# Patient Record
Sex: MALE | Race: WHITE | Employment: FULL TIME | URBAN - METROPOLITAN AREA
[De-identification: names, ages, dates, MRNs, and addresses within clinical notes are randomized per-mention and may not be internally consistent; named-entity substitution may affect disease eponyms.]

---

## 2017-02-14 DIAGNOSIS — I25.10 CAD (CORONARY ARTERY DISEASE): ICD-10-CM

## 2017-02-14 DIAGNOSIS — E78.5 HYPERLIPIDEMIA: ICD-10-CM

## 2017-02-14 DIAGNOSIS — Z82.49 FAMILY HISTORY OF CORONARY ARTERY DISEASE: ICD-10-CM

## 2017-02-14 RX ORDER — ATORVASTATIN CALCIUM 80 MG/1
80 TABLET, FILM COATED ORAL DAILY
Qty: 90 TABLET | Refills: 3 | Status: SHIPPED | OUTPATIENT
Start: 2017-02-14 | End: 2018-01-29

## 2017-06-09 DIAGNOSIS — I25.10 CORONARY ARTERY DISEASE INVOLVING NATIVE CORONARY ARTERY OF NATIVE HEART WITHOUT ANGINA PECTORIS: Primary | ICD-10-CM

## 2017-06-09 RX ORDER — NITROGLYCERIN 0.4 MG/1
TABLET SUBLINGUAL
Qty: 25 TABLET | Refills: 3 | Status: SHIPPED | OUTPATIENT
Start: 2017-06-09 | End: 2018-11-05

## 2017-09-29 ENCOUNTER — TELEPHONE (OUTPATIENT)
Dept: CARDIOLOGY | Facility: CLINIC | Age: 47
End: 2017-09-29

## 2017-09-29 NOTE — TELEPHONE ENCOUNTER
call out to the patient to see if he has had any labs or seen his PMD recently. Patient reporting that he has not seen his PMD recently and had not had any labs completed.. The patient is scheduled for an echo Monday at the [facility] and annual visit with Dr. Osborne on Tuesday 10/3/2017 - the patient likes to get his labs completed at his primary's office - advised to the patient that Dr. Osborne can order labs if needed at the office visit. No other tasks to be done at this time.  Majo Yang

## 2017-10-02 ENCOUNTER — HOSPITAL ENCOUNTER (OUTPATIENT)
Dept: CARDIOLOGY | Facility: CLINIC | Age: 47
Discharge: HOME | End: 2017-10-02
Attending: INTERNAL MEDICINE | Admitting: INTERNAL MEDICINE
Payer: COMMERCIAL

## 2017-10-02 DIAGNOSIS — E78.5 HYPERLIPIDEMIA, UNSPECIFIED HYPERLIPIDEMIA TYPE: ICD-10-CM

## 2017-10-02 PROCEDURE — 93306 TTE W/DOPPLER COMPLETE: CPT | Mod: 26 | Performed by: INTERNAL MEDICINE

## 2017-10-02 PROCEDURE — 93306 TTE W/DOPPLER COMPLETE: CPT

## 2017-10-03 ENCOUNTER — OFFICE VISIT (OUTPATIENT)
Dept: CARDIOLOGY | Facility: CLINIC | Age: 47
End: 2017-10-03
Attending: INTERNAL MEDICINE
Payer: COMMERCIAL

## 2017-10-03 VITALS
HEIGHT: 73 IN | BODY MASS INDEX: 27 KG/M2 | HEART RATE: 85 BPM | SYSTOLIC BLOOD PRESSURE: 140 MMHG | WEIGHT: 203.7 LBS | DIASTOLIC BLOOD PRESSURE: 91 MMHG

## 2017-10-03 DIAGNOSIS — E78.5 HYPERLIPIDEMIA, UNSPECIFIED HYPERLIPIDEMIA TYPE: ICD-10-CM

## 2017-10-03 PROCEDURE — 99213 OFFICE O/P EST LOW 20 MIN: CPT | Performed by: INTERNAL MEDICINE

## 2017-10-03 NOTE — LETTER
10/3/2017    [facility]  [address]  [facility] [address]    RE: Frank Hopper       Dear Colleague,    I had the pleasure of seeing Frank Hopper in the [facility].      Cardiology     I had the pleasure to follow up with your patient, Mr. Frank Hopper, in the Cardiovascular Medicine Clinic at [facility].  As you recall, this is a very pleasant 47-year-old gentleman who I first met in 2012.  He is a former patient of mine from [facility] who has followed to my current practice at [place].  He initially had an abnormal EKG with scooping ST segment depression in the inferior leads suggestive of ischemia.  The patient prior to the acquisition of the EKG had complained of chest discomfort while playing hockey.  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  He underwent a stress echocardiogram demonstrating a normal functional capacity, exercising for 13 METs with no evidence of ischemia; however, due to the concerning EKG, I ordered a CTA.  This demonstrated a  of the RCA with diffuse disease in a mild fashion of the LAD.  He underwent a cardiac cath in 02/2013 and was found to have a  of the RCA as demonstrated by CTA, and this was repaired by placing 3 drug-eluting stents from the mid RCA into the posterior descending artery in an overlapping fashion.  His EKG has since normalized.  He has not had any further symptoms or sequelae.  He had a myocardial perfusion study in 2015.  He exercised for 17 METs on a Alvin protocol.  No EKG changes.  The study was notable for normal LV systolic function with likely diaphragmatic attenuation.  No significant area of ischemia or infarct was noted.  He is not on a beta blocker due to relative bradycardia.  Here for a routine visit and has no active cardiac complaints.        Recent echocardiogram demonstrates normal LV systolic function. No significant valvular modalities noted. Here for his yearly  "follow-up.    PHYSICAL EXAMINATION:   VITAL SIGNS:  Blood pressure (!) 140/91, pulse 85, height 1.854 m (6' 1\"), weight 92.4 kg (203 lb 11.2 oz).  GENERAL:  The patient is alert, oriented, in no apparent distress.   HEENT:  Oropharynx clear, no sinus tenderness.   NECK:  No JVP, no lymphadenopathy, no carotid bruits.   CARDIOVASCULAR:  Regular rate and rhythm, normal S1, S2, no murmurs, gallops or rubs.   LUNGS:  Coarse but clear.   ABDOMEN:  Soft, nontender, nondistended.   EXTREMITIES:  No clubbing, cyanosis or edema.      ASSESSMENT:   1.  Atherosclerotic coronary artery disease with successful complex PCI of RCA  02/2013 with 3 overlapping Promus drug-eluting stents.   2.  Mild nonocclusive disease elsewhere.   3.  Normal myocardial perfusion study 12/2015 from a surveillance standpoint.   4.  Family history of coronary artery disease.   5.  Hyperlipidemia.   6.  Seasonal allergies.      RECOMMENDATIONS:   1.  Atherosclerotic coronary artery disease, stable.  He completed a 2-year course of aspirin and Plavix without difficulty.  Let us continue with his aspirin therapy indefinitely.  Again, we have not started him on a beta blocker due to his relative bradycardia.  He is a lifelong athlete.   2.  Stable EKG and myocardial perfusion study.  Patient exercised for 17 METs with no evidence of ischemia.   3.  Hyperlipidemia.  Let us continue with his statin therapy. He will get a check at his primary care physician.  4.  Borderline hypertension. He attributes this to going to the doctor's office. He also admits to not being as active as in the past. We have offered him therapeutic life changes with increasing his fruit intake, limiting salt intake and increasing activity level. He will purchase a blood pressure machine at home. He will call us with results over the course of the next month. If his blood pressure is elevated we will have him come back and start a medication for this.  5.  Return to clinic in 1 " year's time.    Thank you for allowing me to participate in the care of your patient.    Sincerely,     Sundar Osborne MD     [facility]

## 2017-10-03 NOTE — PROGRESS NOTES
"  Cardiology     I had the pleasure to follow up with your patient, Mr. Frank Hopper, in the Cardiovascular Medicine Clinic at [facility].  As you recall, this is a very pleasant 47-year-old gentleman who I first met in 2012.  He is a former patient of mine from [facility] who has followed to my current practice at [place].  He initially had an abnormal EKG with scooping ST segment depression in the inferior leads suggestive of ischemia.  The patient prior to the acquisition of the EKG had complained of chest discomfort while playing hockey.  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  He underwent a stress echocardiogram demonstrating a normal functional capacity, exercising for 13 METs with no evidence of ischemia; however, due to the concerning EKG, I ordered a CTA.  This demonstrated a  of the RCA with diffuse disease in a mild fashion of the LAD.  He underwent a cardiac cath in 02/2013 and was found to have a  of the RCA as demonstrated by CTA, and this was repaired by placing 3 drug-eluting stents from the mid RCA into the posterior descending artery in an overlapping fashion.  His EKG has since normalized.  He has not had any further symptoms or sequelae.  He had a myocardial perfusion study in 2015.  He exercised for 17 METs on a Alvin protocol.  No EKG changes.  The study was notable for normal LV systolic function with likely diaphragmatic attenuation.  No significant area of ischemia or infarct was noted.  He is not on a beta blocker due to relative bradycardia.  Here for a routine visit and has no active cardiac complaints.        Recent echocardiogram demonstrates normal LV systolic function. No significant valvular modalities noted. Here for his yearly follow-up.    PHYSICAL EXAMINATION:   VITAL SIGNS:  Blood pressure (!) 140/91, pulse 85, height 1.854 m (6' 1\"), weight 92.4 kg (203 lb 11.2 oz).  GENERAL:  The patient is alert, oriented, in no apparent distress.   HEENT:  " Oropharynx clear, no sinus tenderness.   NECK:  No JVP, no lymphadenopathy, no carotid bruits.   CARDIOVASCULAR:  Regular rate and rhythm, normal S1, S2, no murmurs, gallops or rubs.   LUNGS:  Coarse but clear.   ABDOMEN:  Soft, nontender, nondistended.   EXTREMITIES:  No clubbing, cyanosis or edema.      ASSESSMENT:   1.  Atherosclerotic coronary artery disease with successful complex PCI of RCA  02/2013 with 3 overlapping Promus drug-eluting stents.   2.  Mild nonocclusive disease elsewhere.   3.  Normal myocardial perfusion study 12/2015 from a surveillance standpoint.   4.  Family history of coronary artery disease.   5.  Hyperlipidemia.   6.  Seasonal allergies.      RECOMMENDATIONS:   1.  Atherosclerotic coronary artery disease, stable.  He completed a 2-year course of aspirin and Plavix without difficulty.  Let us continue with his aspirin therapy indefinitely.  Again, we have not started him on a beta blocker due to his relative bradycardia.  He is a lifelong athlete.   2.  Stable EKG and myocardial perfusion study.  Patient exercised for 17 METs with no evidence of ischemia.   3.  Hyperlipidemia.  Let us continue with his statin therapy. He will get a check at his primary care physician.  4.  Borderline hypertension. He attributes this to going to the doctor's office. He also admits to not being as active as in the past. We have offered him therapeutic life changes with increasing his fruit intake, limiting salt intake and increasing activity level. He will purchase a blood pressure machine at home. He will call us with results over the course of the next month. If his blood pressure is elevated we will have him come back and start a medication for this.  5.  Return to clinic in 1 year's time.    Sundar Osborne MD

## 2017-10-03 NOTE — MR AVS SNAPSHOT
"              After Visit Summary   10/3/2017    Frank Hopper    MRN: [id number]           Patient Information     Date Of Birth          1970        Visit Information        Provider Department      10/3/2017 1:15 PM Sundar Osborne MD [facility] [employer]        Today's Diagnoses     Hyperlipidemia, unspecified hyperlipidemia type           Follow-ups after your visit        Additional Services     Follow-Up with Cardiologist                 Future tests that were ordered for you today     Open Future Orders        Priority Expected Expires Ordered    Follow-Up with Cardiologist Routine 10/3/2018 10/4/2018 10/3/2017            Who to contact     If you have questions or need follow up information about today's clinic visit or your schedule please contact [facility] directly at [phone].  Normal or non-critical lab and imaging results will be communicated to you by MyChart, letter or phone within 4 business days after the clinic has received the results. If you do not hear from us within 7 days, please contact the clinic through MyChart or phone. If you have a critical or abnormal lab result, we will notify you by phone as soon as possible.  Submit refill requests through [employer] or call your pharmacy and they will forward the refill request to us. Please allow 3 business days for your refill to be completed.          Additional Information About Your Visit        [employer] Information     Cyanto lets you send messages to your doctor, view your test results, renew your prescriptions, schedule appointments and more. To sign up, go to [email or web address] . Click on \"Log in\" on the left side of the screen, which will take you to the Welcome page. Then click on \"Sign up Now\" on the right side of the page.     You will be asked to enter the access code listed below, as well as some personal information. Please follow the " "directions to create your username and password.     Your access code is: [email or web address]  Expires: 2018  1:39 PM     Your access code will  in 90 days. If you need help or a new code, please call your [place] clinic or [phone].        Care EveryWhere ID     This is your Care EveryWhere ID. This could be used by other organizations to access your [place] medical records  [id number]        Your Vitals Were     Pulse Height BMI (Body Mass Index)             85 1.854 m (6' 1\") 26.87 kg/m2          Blood Pressure from Last 3 Encounters:   10/03/17 (!) 140/91   10/10/16 130/90   16 114/70    Weight from Last 3 Encounters:   10/03/17 92.4 kg (203 lb 11.2 oz)   10/10/16 93.4 kg (206 lb)   16 90.7 kg (200 lb)              We Performed the Following     Follow-Up with Cardiologist        Primary Care Provider Office Phone # Fax #    [facility] [phone]       [address]  [facility] [address]        Equal Access to Services     EVONNE MARTIN : Hadii aad ku hadasho Soomaali, waaxda luqadaha, qaybta kaalmada adeegyada, enriqueta isbelln lilliana puckett. So [facility] [phone].    ATENCIÓN: Si habla español, tiene a martinez disposición servicios gratuitos de asistencia lingüística. Llame al [phone].    We comply with applicable federal civil rights laws and [place] laws. We do not discriminate on the basis of race, color, national origin, age, disability, sex, sexual orientation, or gender identity.            Thank you!     Thank you for choosing [facility] [place]  for your care. Our goal is always to provide you with excellent care. Hearing back from our patients is one way we can continue to improve our services. Please take a few minutes to complete the written survey that you may receive in the mail after your visit with us. Thank you!             Your Updated Medication List - Protect others around you: Learn how to " safely use, store and throw away your medicines at www.disposemymeds.org.          This list is accurate as of: 10/3/17  1:39 PM.  Always use your most recent med list.                   Brand Name Dispense Instructions for use Diagnosis    aspirin 81 MG tablet      Take by mouth daily        atorvastatin 80 MG tablet    LIPITOR    90 tablet    Take 1 tablet (80 mg) by mouth daily    CAD (coronary artery disease), Family history of coronary artery disease, Hyperlipidemia       loratadine 10 MG tablet    CLARITIN     Take 10 mg by mouth as needed for allergies        nitroGLYcerin 0.4 MG sublingual tablet    NITROSTAT    25 tablet    1 tablet every 3-5 minutes prn chest pain    Coronary artery disease involving native coronary artery of native heart without angina pectoris

## 2017-10-17 ENCOUNTER — CARE COORDINATION (OUTPATIENT)
Dept: CARDIOLOGY | Facility: CLINIC | Age: 47
End: 2017-10-17

## 2017-10-17 NOTE — PROGRESS NOTES
lab orders for FLP/ALT and BMP sent to patients PMD - [facility]. Patient aware to contact our clinic if these orders are not obtained by [facility]. No other tasks to be done at this time. Majo Yang

## 2017-10-26 ENCOUNTER — TRANSFERRED RECORDS (OUTPATIENT)
Dept: HEALTH INFORMATION MANAGEMENT | Facility: CLINIC | Age: 47
End: 2017-10-26

## 2017-10-26 LAB
ANION GAP SERPL CALCULATED.3IONS-SCNC: 9 MMOL/L
BUN SERPL-MCNC: 10 MG/DL
CALCIUM SERPL-MCNC: 9.4 MG/DL
CHLORIDE SERPLBLD-SCNC: 104 MMOL/L
CHOLEST SERPL-MCNC: 184 MG/DL
CO2 SERPL-SCNC: 25 MMOL/L
CREAT SERPL-MCNC: 0.98 MG/DL
GFR SERPL CREATININE-BSD FRML MDRD: >60 ML/MIN/1.73M2
GLUCOSE SERPL-MCNC: 101 MG/DL (ref 70–99)
HDLC SERPL-MCNC: 51 MG/DL
LDLC SERPL CALC-MCNC: 104 MG/DL
NONHDLC SERPL-MCNC: 133 MG/DL
POTASSIUM SERPL-SCNC: 4.2 MMOL/L
SODIUM SERPL-SCNC: 138 MMOL/L
TRIGL SERPL-MCNC: 145 MG/DL

## 2017-12-18 DIAGNOSIS — E78.5 HYPERLIPIDEMIA, UNSPECIFIED HYPERLIPIDEMIA TYPE: Primary | ICD-10-CM

## 2018-01-29 DIAGNOSIS — I25.10 CAD (CORONARY ARTERY DISEASE): ICD-10-CM

## 2018-01-29 DIAGNOSIS — Z82.49 FAMILY HISTORY OF CORONARY ARTERY DISEASE: ICD-10-CM

## 2018-01-29 DIAGNOSIS — E78.5 HYPERLIPIDEMIA: ICD-10-CM

## 2018-01-29 RX ORDER — ATORVASTATIN CALCIUM 80 MG/1
80 TABLET, FILM COATED ORAL DAILY
Qty: 90 TABLET | Refills: 2 | Status: SHIPPED | OUTPATIENT
Start: 2018-01-29 | End: 2018-07-10

## 2018-05-15 ENCOUNTER — CARE COORDINATION (OUTPATIENT)
Dept: CARDIOLOGY | Facility: CLINIC | Age: 48
End: 2018-05-15

## 2018-05-15 DIAGNOSIS — I10 HTN (HYPERTENSION): Primary | ICD-10-CM

## 2018-05-15 RX ORDER — AMLODIPINE BESYLATE 2.5 MG/1
2.5 TABLET ORAL DAILY
Qty: 90 TABLET | Refills: 0 | Status: SHIPPED | OUTPATIENT
Start: 2018-05-15 | End: 2018-07-10

## 2018-05-15 NOTE — PROGRESS NOTES
Patient requesting to follow up with his primary out in [place] for a BP check after 2-4 weeks.. Dr. India granados with this plan.  Primary MD to fax us the records from visit. No other tasks to be done at this time. Majo Yang

## 2018-05-15 NOTE — PROGRESS NOTES
patient c/o uncontrolled BP - Dr. Osborne would like to start him on Norvasc 2.5 mg.  The patient to follow up in clinic to review BP in the next 2-4 weeks. Patient advised to contact our clinic if the BP doesn't lower or he notices any lower extremity swelling.. The patient will contact our clinic to schedule his follow up. Order placed in epic. No other tasks to be done at his time. Majo Yang

## 2018-07-10 DIAGNOSIS — Z82.49 FAMILY HISTORY OF CORONARY ARTERY DISEASE: ICD-10-CM

## 2018-07-10 DIAGNOSIS — I25.10 CAD (CORONARY ARTERY DISEASE): ICD-10-CM

## 2018-07-10 DIAGNOSIS — I10 HTN (HYPERTENSION): ICD-10-CM

## 2018-07-10 DIAGNOSIS — E78.5 HYPERLIPIDEMIA: ICD-10-CM

## 2018-07-10 RX ORDER — ATORVASTATIN CALCIUM 80 MG/1
80 TABLET, FILM COATED ORAL DAILY
Qty: 90 TABLET | Refills: 3 | Status: SHIPPED | OUTPATIENT
Start: 2018-07-10 | End: 2019-08-11

## 2018-07-10 RX ORDER — AMLODIPINE BESYLATE 2.5 MG/1
2.5 TABLET ORAL 2 TIMES DAILY
Qty: 180 TABLET | Refills: 3 | Status: SHIPPED | OUTPATIENT
Start: 2018-07-10 | End: 2018-08-13

## 2018-08-13 DIAGNOSIS — I10 HTN (HYPERTENSION): ICD-10-CM

## 2018-08-13 RX ORDER — AMLODIPINE BESYLATE 2.5 MG/1
2.5 TABLET ORAL 2 TIMES DAILY
Qty: 180 TABLET | Refills: 3 | Status: SHIPPED | OUTPATIENT
Start: 2018-08-13 | End: 2021-01-01

## 2018-11-02 PROBLEM — I10 HTN (HYPERTENSION): Status: ACTIVE | Noted: 2018-11-02

## 2018-11-05 ENCOUNTER — OFFICE VISIT (OUTPATIENT)
Dept: CARDIOLOGY | Facility: CLINIC | Age: 48
End: 2018-11-05
Payer: COMMERCIAL

## 2018-11-05 VITALS
HEART RATE: 80 BPM | HEIGHT: 73 IN | DIASTOLIC BLOOD PRESSURE: 88 MMHG | SYSTOLIC BLOOD PRESSURE: 140 MMHG | BODY MASS INDEX: 27.17 KG/M2 | WEIGHT: 205 LBS

## 2018-11-05 DIAGNOSIS — I25.10 CORONARY ARTERY DISEASE INVOLVING NATIVE CORONARY ARTERY OF NATIVE HEART WITHOUT ANGINA PECTORIS: ICD-10-CM

## 2018-11-05 PROCEDURE — 99214 OFFICE O/P EST MOD 30 MIN: CPT | Performed by: INTERNAL MEDICINE

## 2018-11-05 RX ORDER — NITROGLYCERIN 0.4 MG/1
TABLET SUBLINGUAL
Qty: 25 TABLET | Refills: 3 | Status: SHIPPED | OUTPATIENT
Start: 2018-11-05 | End: 2019-11-08

## 2018-11-05 NOTE — MR AVS SNAPSHOT
"              After Visit Summary   11/5/2018    Frank Hopper    MRN: [id number]           Patient Information     Date Of Birth          1970        Visit Information        Provider Department      11/5/2018 1:45 PM Sundar Osborne MD [facility]        Today's Diagnoses     Coronary artery disease involving native coronary artery of native heart without angina pectoris           Follow-ups after your visit        Additional Services     Follow-Up with Cardiologist                 Future tests that were ordered for you today     Open Future Orders        Priority Expected Expires Ordered    Follow-Up with Cardiologist Routine 11/5/2019 11/6/2019 11/5/2018    NM Exercise stress test (nuc card) Routine 12/5/2018 11/5/2019 11/5/2018            Who to contact     If you have questions or need follow up information about today's clinic visit or your schedule please contact [facility] directly at [phone].  Normal or non-critical lab and imaging results will be communicated to you by Mount[employer]hart, letter or phone within 4 business days after the clinic has received the results. If you do not hear from us within 7 days, please contact the clinic through [employer] or phone. If you have a critical or abnormal lab result, we will notify you by phone as soon as possible.  Submit refill requests through [employer] or call your pharmacy and they will forward the refill request to us. Please allow 3 business days for your refill to be completed.          Additional Information About Your Visit        [employer] Information     [employer] lets you send messages to your doctor, view your test results, renew your prescriptions, schedule appointments and more. To sign up, go to [email or web address] . Click on \"Log in\" on the left side of the screen, which will take you to the Welcome page. Then click on \"Sign up Now\" on the right side of the page. " "    You will be asked to enter the access code listed below, as well as some personal information. Please follow the directions to create your username and password.     Your access code is: [email or web address]  Expires: 2/3/2019  2:05 PM     Your access code will  in 90 days. If you need help or a new code, please call your [place] clinic or [phone].        Care EveryWhere ID     This is your Care EveryWhere ID. This could be used by other organizations to access your [place] medical records  [id number]        Your Vitals Were     Pulse Height BMI (Body Mass Index)             80 1.854 m (6' 1\") 27.05 kg/m2          Blood Pressure from Last 3 Encounters:   18 140/88   10/03/17 (!) 140/91   10/10/16 130/90    Weight from Last 3 Encounters:   18 93 kg (205 lb)   10/03/17 92.4 kg (203 lb 11.2 oz)   10/10/16 93.4 kg (206 lb)                 Today's Medication Changes          These changes are accurate as of 18  2:05 PM.  If you have any questions, ask your nurse or doctor.               These medicines have changed or have updated prescriptions.        Dose/Directions    amLODIPine 2.5 MG tablet   Commonly known as:  NORVASC   This may have changed:  when to take this   Used for:  HTN (hypertension)        Dose:  2.5 mg   Take 1 tablet (2.5 mg) by mouth 2 times daily   Quantity:  180 tablet   Refills:  3            Where to get your medicines      These medications were sent to [place] [address] [place] [facility] [place] [place] -  [facility]   [address] [address]     Phone:  [phone]     nitroGLYcerin 0.4 MG sublingual tablet                Primary Care Provider Office Phone # Fax #    [facility] [phone]       [address] [address]        Equal Access to Services     RENÉ MARITN AH: Alyson Green, waaxda luqadaha, qaybta kaalmada lillianayadameon, enriqueta crowder . So [facility] " [phone].    ATENCIÓN: Si lali freire, tiene a martinez disposición servicios gratuitos de asistencia lingüística. Jose bates [phone].    We comply with applicable federal civil rights laws and [place] laws. We do not discriminate on the basis of race, color, national origin, age, disability, sex, sexual orientation, or gender identity.            Thank you!     Thank you for choosing [facility]  for your care. Our goal is always to provide you with excellent care. Hearing back from our patients is one way we can continue to improve our services. Please take a few minutes to complete the written survey that you may receive in the mail after your visit with us. Thank you!             Your Updated Medication List - Protect others around you: Learn how to safely use, store and throw away your medicines at www.disposemymeds.org.          This list is accurate as of 11/5/18  2:05 PM.  Always use your most recent med list.                   Brand Name Dispense Instructions for use Diagnosis    amLODIPine 2.5 MG tablet    NORVASC    180 tablet    Take 1 tablet (2.5 mg) by mouth 2 times daily    HTN (hypertension)       aspirin 81 MG tablet      Take by mouth daily        atorvastatin 80 MG tablet    LIPITOR    90 tablet    Take 1 tablet (80 mg) by mouth daily    CAD (coronary artery disease), Family history of coronary artery disease, Hyperlipidemia       loratadine 10 MG tablet    CLARITIN     Take 10 mg by mouth as needed for allergies        nitroGLYcerin 0.4 MG sublingual tablet    NITROSTAT    25 tablet    1 tablet every 3-5 minutes prn chest pain    Coronary artery disease involving native coronary artery of native heart without angina pectoris

## 2018-11-05 NOTE — LETTER
11/5/2018    [facility]  [address]  [facility] [address]    RE: Frank Hopper       Dear Colleague,    I had the pleasure of seeing Frank Hopper in the [facility].      Cardiology     I had the pleasure to follow up with your patient, Mr. Frank Hopper, in the Cardiovascular Medicine Clinic at [facility].  As you recall, this is a very pleasant 48-year-old gentleman who I first met in 2012.  He is a former patient of mine from [facility] who has followed to my current practice at [place].  He initially had an abnormal EKG with scooping ST segment depression in the inferior leads suggestive of ischemia.  The patient prior to the acquisition of the EKG had complained of chest discomfort while playing hockey.  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  He underwent a stress echocardiogram demonstrating a normal functional capacity, exercising for 13 METs with no evidence of ischemia; however, due to the concerning EKG, I ordered a CTA.  This demonstrated a  of the RCA with diffuse disease in a mild fashion of the LAD.  He underwent a cardiac cath in 02/2013 and was found to have a  of the RCA as demonstrated by CTA, and this was repaired by placing 3 drug-eluting stents from the mid RCA into the posterior descending artery in an overlapping fashion.  His EKG has since normalized.  He has not had any further symptoms or sequelae.  He had a myocardial perfusion study in 2015.  He exercised for 17 METs on a Alvin protocol.  No EKG changes.  The study was notable for normal LV systolic function with likely diaphragmatic attenuation.  No significant area of ischemia or infarct was noted.  He is not on a beta blocker due to relative bradycardia.  Here for a routine visit and has no active cardiac complaints.        Recent echocardiogram demonstrates normal LV systolic function. No significant valvular modalities noted. Here for his yearly  "follow-up.    Wants to resume hockey playing, however worried about his fitness level.       PHYSICAL EXAMINATION:   VITAL SIGNS:  Blood pressure 140/88, pulse 80, height 1.854 m (6' 1\"), weight 93 kg (205 lb).  GENERAL:  The patient is alert, oriented, in no apparent distress.   HEENT:  Oropharynx clear, no sinus tenderness.   NECK:  No JVP, no lymphadenopathy, no carotid bruits.   CARDIOVASCULAR:  Regular rate and rhythm, normal S1, S2, no murmurs, gallops or rubs.   LUNGS:  Coarse but clear.   ABDOMEN:  Soft, nontender, nondistended.   EXTREMITIES:  No clubbing, cyanosis or edema.      ASSESSMENT:   1.  Atherosclerotic coronary artery disease with successful complex PCI of RCA  02/2013 with 3 overlapping Promus drug-eluting stents.   2.  Mild nonocclusive disease elsewhere.   3.  Normal myocardial perfusion study 12/2015 from a surveillance standpoint.   4.  Family history of coronary artery disease.   5.  Hyperlipidemia.   6.  Seasonal allergies.      RECOMMENDATIONS:   1.  Atherosclerotic coronary artery disease, stable.  He completed a 2-year course of aspirin and Plavix without difficulty.  Let us continue with his aspirin therapy indefinitely.  Again, we have not started him on a beta blocker due to his relative bradycardia and fatigue.  He is a lifelong athlete.   2.  Stable EKG and myocardial perfusion study 2015.  Patient exercised for 17 METs with no evidence of ischemia.   3.  Hyperlipidemia.  Let us continue with his statin therapy. He will get a check at his primary care physician. LDL is 99, he will try to improve his diet or we can change to Crestor.    4.  HTN: Continue Cozaar 50 mg daily  5.  Will get surveillance nuclear MPI at [place] (closer for patient).    6.  Return to clinic in 1 year's time or earlier if significant abnormalities noted in MPI.      Sundar Osbonre MD      Thank you for allowing me to participate in the care of your patient.      Sincerely,     Sundar Pederson " MD [place]     [facility]

## 2018-11-05 NOTE — PROGRESS NOTES
"  Cardiology     I had the pleasure to follow up with your patient, Mr. Frank Hopper, in the Cardiovascular Medicine Clinic at [facility].  As you recall, this is a very pleasant 48-year-old gentleman who I first met in 2012.  He is a former patient of mine from [facility] who has followed to my current practice at [place].  He initially had an abnormal EKG with scooping ST segment depression in the inferior leads suggestive of ischemia.  The patient prior to the acquisition of the EKG had complained of chest discomfort while playing hockey.  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  He underwent a stress echocardiogram demonstrating a normal functional capacity, exercising for 13 METs with no evidence of ischemia; however, due to the concerning EKG, I ordered a CTA.  This demonstrated a  of the RCA with diffuse disease in a mild fashion of the LAD.  He underwent a cardiac cath in 02/2013 and was found to have a  of the RCA as demonstrated by CTA, and this was repaired by placing 3 drug-eluting stents from the mid RCA into the posterior descending artery in an overlapping fashion.  His EKG has since normalized.  He has not had any further symptoms or sequelae.  He had a myocardial perfusion study in 2015.  He exercised for 17 METs on a Alvin protocol.  No EKG changes.  The study was notable for normal LV systolic function with likely diaphragmatic attenuation.  No significant area of ischemia or infarct was noted.  He is not on a beta blocker due to relative bradycardia.  Here for a routine visit and has no active cardiac complaints.        Recent echocardiogram demonstrates normal LV systolic function. No significant valvular modalities noted. Here for his yearly follow-up.    Wants to resume hockey playing, however worried about his fitness level.       PHYSICAL EXAMINATION:   VITAL SIGNS:  Blood pressure 140/88, pulse 80, height 1.854 m (6' 1\"), weight 93 kg (205 lb).  GENERAL:  The " patient is alert, oriented, in no apparent distress.   HEENT:  Oropharynx clear, no sinus tenderness.   NECK:  No JVP, no lymphadenopathy, no carotid bruits.   CARDIOVASCULAR:  Regular rate and rhythm, normal S1, S2, no murmurs, gallops or rubs.   LUNGS:  Coarse but clear.   ABDOMEN:  Soft, nontender, nondistended.   EXTREMITIES:  No clubbing, cyanosis or edema.      ASSESSMENT:   1.  Atherosclerotic coronary artery disease with successful complex PCI of RCA  02/2013 with 3 overlapping Promus drug-eluting stents.   2.  Mild nonocclusive disease elsewhere.   3.  Normal myocardial perfusion study 12/2015 from a surveillance standpoint.   4.  Family history of coronary artery disease.   5.  Hyperlipidemia.   6.  Seasonal allergies.      RECOMMENDATIONS:   1.  Atherosclerotic coronary artery disease, stable.  He completed a 2-year course of aspirin and Plavix without difficulty.  Let us continue with his aspirin therapy indefinitely.  Again, we have not started him on a beta blocker due to his relative bradycardia and fatigue.  He is a lifelong athlete.   2.  Stable EKG and myocardial perfusion study 2015.  Patient exercised for 17 METs with no evidence of ischemia.   3.  Hyperlipidemia.  Let us continue with his statin therapy. He will get a check at his primary care physician. LDL is 99, he will try to improve his diet or we can change to Crestor.    4.  HTN: Continue Cozaar 50 mg daily  5.  Will get surveillance nuclear MPI at [place] (closer for patient).    6.  Return to clinic in 1 year's time or earlier if significant abnormalities noted in MPI.      Sundar Osborne MD

## 2018-11-05 NOTE — LETTER
11/5/2018    [facility]  [address]  [facility] [address]    RE: Frank Hopper       Dear Colleague,    I had the pleasure of seeing Frank Hopper in [facility].      Cardiology     I had the pleasure to follow up with your patient, Mr. Frank Hopper, in the Cardiovascular Medicine Clinic at [facility].  As you recall, this is a very pleasant 48-year-old gentleman who I first met in 2012.  He is a former patient of mine from [facility] who has followed to my current practice at [place].  He initially had an abnormal EKG with scooping ST segment depression in the inferior leads suggestive of ischemia.  The patient prior to the acquisition of the EKG had complained of chest discomfort while playing hockey.  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  He underwent a stress echocardiogram demonstrating a normal functional capacity, exercising for 13 METs with no evidence of ischemia; however, due to the concerning EKG, I ordered a CTA.  This demonstrated a  of the RCA with diffuse disease in a mild fashion of the LAD.  He underwent a cardiac cath in 02/2013 and was found to have a  of the RCA as demonstrated by CTA, and this was repaired by placing 3 drug-eluting stents from the mid RCA into the posterior descending artery in an overlapping fashion.  His EKG has since normalized.  He has not had any further symptoms or sequelae.  He had a myocardial perfusion study in 2015.  He exercised for 17 METs on a Alvin protocol.  No EKG changes.  The study was notable for normal LV systolic function with likely diaphragmatic attenuation.  No significant area of ischemia or infarct was noted.  He is not on a beta blocker due to relative bradycardia.  Here for a routine visit and has no active cardiac complaints.        Recent echocardiogram demonstrates normal LV systolic function. No significant valvular modalities noted. Here for his yearly  "follow-up.    Wants to resume hockey playing, however worried about his fitness level.       PHYSICAL EXAMINATION:   VITAL SIGNS:  Blood pressure 140/88, pulse 80, height 1.854 m (6' 1\"), weight 93 kg (205 lb).  GENERAL:  The patient is alert, oriented, in no apparent distress.   HEENT:  Oropharynx clear, no sinus tenderness.   NECK:  No JVP, no lymphadenopathy, no carotid bruits.   CARDIOVASCULAR:  Regular rate and rhythm, normal S1, S2, no murmurs, gallops or rubs.   LUNGS:  Coarse but clear.   ABDOMEN:  Soft, nontender, nondistended.   EXTREMITIES:  No clubbing, cyanosis or edema.      ASSESSMENT:   1.  Atherosclerotic coronary artery disease with successful complex PCI of RCA  02/2013 with 3 overlapping Promus drug-eluting stents.   2.  Mild nonocclusive disease elsewhere.   3.  Normal myocardial perfusion study 12/2015 from a surveillance standpoint.   4.  Family history of coronary artery disease.   5.  Hyperlipidemia.   6.  Seasonal allergies.      RECOMMENDATIONS:   1.  Atherosclerotic coronary artery disease, stable.  He completed a 2-year course of aspirin and Plavix without difficulty.  Let us continue with his aspirin therapy indefinitely.  Again, we have not started him on a beta blocker due to his relative bradycardia and fatigue.  He is a lifelong athlete.   2.  Stable EKG and myocardial perfusion study 2015.  Patient exercised for 17 METs with no evidence of ischemia.   3.  Hyperlipidemia.  Let us continue with his statin therapy. He will get a check at his primary care physician. LDL is 99, he will try to improve his diet or we can change to Crestor.    4.  HTN: Continue Cozaar 50 mg daily  5.  Will get surveillance nuclear MPI at [place] (closer for patient).    6.  Return to clinic in 1 year's time or earlier if significant abnormalities noted in MPI.      Sundar Osborne MD      Thank you for allowing me to participate in the care of your patient.      Sincerely,     Sundar Pederson " MD [place]     [facility]    cc:   No referring provider defined for this encounter.

## 2018-11-21 ENCOUNTER — HOSPITAL ENCOUNTER (OUTPATIENT)
Dept: NUCLEAR MEDICINE | Facility: CLINIC | Age: 48
Setting detail: NUCLEAR MEDICINE
Discharge: HOME | End: 2018-11-21
Attending: INTERNAL MEDICINE | Admitting: INTERNAL MEDICINE
Payer: COMMERCIAL

## 2018-11-21 DIAGNOSIS — I25.10 CORONARY ARTERY DISEASE INVOLVING NATIVE CORONARY ARTERY OF NATIVE HEART WITHOUT ANGINA PECTORIS: ICD-10-CM

## 2018-11-21 PROCEDURE — 93016 CV STRESS TEST SUPVJ ONLY: CPT | Performed by: INTERNAL MEDICINE

## 2018-11-21 PROCEDURE — 78452 HT MUSCLE IMAGE SPECT MULT: CPT

## 2018-11-21 PROCEDURE — 93017 CV STRESS TEST TRACING ONLY: CPT

## 2018-11-21 PROCEDURE — 78452 HT MUSCLE IMAGE SPECT MULT: CPT | Mod: 26 | Performed by: INTERNAL MEDICINE

## 2018-11-21 PROCEDURE — A9502 TC99M TETROFOSMIN: HCPCS | Performed by: INTERNAL MEDICINE

## 2018-11-21 PROCEDURE — 93018 CV STRESS TEST I&R ONLY: CPT | Performed by: INTERNAL MEDICINE

## 2018-11-21 PROCEDURE — 34300033 ZZH RX 343: Performed by: INTERNAL MEDICINE

## 2018-11-21 RX ADMIN — TETROFOSMIN 10.1 MCI.: 1.38 INJECTION, POWDER, LYOPHILIZED, FOR SOLUTION INTRAVENOUS at 08:45

## 2018-11-21 RX ADMIN — TETROFOSMIN 32.6 MCI.: 1.38 INJECTION, POWDER, LYOPHILIZED, FOR SOLUTION INTRAVENOUS at 10:27

## 2019-08-11 DIAGNOSIS — I25.10 CAD (CORONARY ARTERY DISEASE): ICD-10-CM

## 2019-08-11 DIAGNOSIS — E78.5 HYPERLIPIDEMIA: ICD-10-CM

## 2019-08-11 DIAGNOSIS — Z82.49 FAMILY HISTORY OF CORONARY ARTERY DISEASE: ICD-10-CM

## 2019-08-12 RX ORDER — ATORVASTATIN CALCIUM 80 MG/1
TABLET, FILM COATED ORAL
Qty: 90 TABLET | Refills: 1 | Status: SHIPPED | OUTPATIENT
Start: 2019-08-12 | End: 2020-02-17

## 2019-09-12 DIAGNOSIS — I25.10 CORONARY ARTERY DISEASE INVOLVING NATIVE CORONARY ARTERY OF NATIVE HEART WITHOUT ANGINA PECTORIS: Primary | ICD-10-CM

## 2019-11-08 ENCOUNTER — OFFICE VISIT (OUTPATIENT)
Dept: CARDIOLOGY | Facility: CLINIC | Age: 49
End: 2019-11-08
Payer: COMMERCIAL

## 2019-11-08 VITALS
DIASTOLIC BLOOD PRESSURE: 82 MMHG | OXYGEN SATURATION: 99 % | BODY MASS INDEX: 27.44 KG/M2 | WEIGHT: 208 LBS | SYSTOLIC BLOOD PRESSURE: 124 MMHG | HEART RATE: 71 BPM

## 2019-11-08 DIAGNOSIS — I25.10 CORONARY ARTERY DISEASE INVOLVING NATIVE CORONARY ARTERY OF NATIVE HEART WITHOUT ANGINA PECTORIS: ICD-10-CM

## 2019-11-08 LAB
ALT SERPL W P-5'-P-CCNC: 33 U/L (ref 0–70)
CHOLEST SERPL-MCNC: 157 MG/DL
HDLC SERPL-MCNC: 54 MG/DL
LDLC SERPL CALC-MCNC: 77 MG/DL
NONHDLC SERPL-MCNC: 103 MG/DL
TRIGL SERPL-MCNC: 132 MG/DL

## 2019-11-08 PROCEDURE — 80061 LIPID PANEL: CPT | Performed by: INTERNAL MEDICINE

## 2019-11-08 PROCEDURE — 84460 ALANINE AMINO (ALT) (SGPT): CPT | Performed by: INTERNAL MEDICINE

## 2019-11-08 PROCEDURE — 99214 OFFICE O/P EST MOD 30 MIN: CPT | Performed by: INTERNAL MEDICINE

## 2019-11-08 PROCEDURE — 36415 COLL VENOUS BLD VENIPUNCTURE: CPT | Performed by: INTERNAL MEDICINE

## 2019-11-08 RX ORDER — NITROGLYCERIN 0.4 MG/1
TABLET SUBLINGUAL
Qty: 25 TABLET | Refills: 3 | Status: SHIPPED | OUTPATIENT
Start: 2019-11-08 | End: 2020-12-21

## 2019-11-08 NOTE — PROGRESS NOTES
Cardiology     I had the pleasure to follow up with your patient, Mr. Frank Hopper, in the Cardiovascular Medicine Clinic at [facility].  As you recall, this is a very pleasant 49-year-old gentleman who I first met in 2012.  He is a former patient of mine from [facility] who has followed to my current practice at [place].  He initially had an abnormal EKG with scooping ST segment depression in the inferior leads suggestive of ischemia.  The patient prior to the acquisition of the EKG had complained of chest discomfort while playing hockey.  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  He underwent a stress echocardiogram demonstrating a normal functional capacity, exercising for 13 METs with no evidence of ischemia; however, due to the concerning EKG, I ordered a CTA.  This demonstrated a  of the RCA with diffuse disease in a mild fashion of the LAD.  He underwent a cardiac cath in 02/2013 and was found to have a  of the RCA as demonstrated by CTA, and this was repaired by placing 3 drug-eluting stents from the mid RCA into the posterior descending artery in an overlapping fashion.  His EKG has since normalized.  He has not had any further symptoms or sequelae.  He had a myocardial perfusion study in 2015.  He exercised for 17 METs on a Alvin protocol.  No EKG changes.  The study was notable for normal LV systolic function with likely diaphragmatic attenuation.  No significant area of ischemia or infarct was noted.  He is not on a beta blocker due to relative bradycardia.  Here for a routine visit and has no active cardiac complaints.        Recent echocardiogram demonstrates normal LV systolic function. No significant valvular modalities noted. Here for his yearly follow-up.      2018 MPI:  Impression    1.  Myocardial perfusion imaging using single isotope technique  demonstrated normal myocardial perfusion.   2. Gated images demonstrated normal wall motion.  The left  ventricular  systolic function is normal with a calculated ejection fraction of  63%.     Procedure  The patient performed treadmill exercise using a Alvin protocol,  completing 13 minutes and 40 seconds with an estimated workload of  16.5 METS.  The test was terminated due to fatigue. The heart rate was  58 beats per minute at baseline and increased to 169 beats at peak  exercise, which was 99% of the maximum predicted heart rate. The rest  blood pressure was 122/80 mm/Hg and peak blood pressure is 172/76mm/Hg  with rate pressure product of 29,068. The patient experienced no chest  pain during the test. The patient was not on a beta blocker.     Myocardial perfusion imaging was performed at rest, approximately 45  minutes after the injection intravenously of 10.1of Tc-99m Myoview. At  peak exercise, the patient was injected intravenously with 32.6 mCi of   Tc-99m Myoview and exercise continued for approximately 1 minute.  Gated post-stress tomographic imaging was performed approximately 30  minutes after stress     EKG Findings  The resting EKG demonstrated sinus bradycardia incomplete right bundle  branch block. Small Q waves seen in the inferior leads probably not a  physiologic significance . The stress EKG demonstrated no significant  ST segment changes.  BMI 27     Tomographic Findings  Overall, the study quality is adequate though there is significant  diaphragmatic attenuation . On the stress images, normal myocardial  perfusion appears present. On the rest images, decrease in radiotracer  uptake in the antral septum and the inferior wall and the septum  likely due to attenuation artifacts . Gated images demonstrated normal  wall motion. The left ventricular ejection fraction was calculated to  be 63%. TID was absent.          PHYSICAL EXAMINATION:   VITAL SIGNS:  Blood pressure 124/82, pulse 71, weight 94.3 kg (208 lb), SpO2 99 %.  GENERAL:  The patient is alert, oriented, in no apparent distress.   HEENT:   Oropharynx clear, no sinus tenderness.   NECK:  No JVP, no lymphadenopathy, no carotid bruits.   CARDIOVASCULAR:  Regular rate and rhythm, normal S1, S2, no murmurs, gallops or rubs.   LUNGS:  Coarse but clear.   ABDOMEN:  Soft, nontender, nondistended.   EXTREMITIES:  No clubbing, cyanosis or edema.      ASSESSMENT:   1.  Atherosclerotic coronary artery disease with successful complex PCI of RCA  02/2013 with 3 overlapping Promus drug-eluting stents.   2.  Mild nonocclusive disease elsewhere.   3.  Normal myocardial perfusion study 12/2015 from a surveillance standpoint.   4.  Family history of coronary artery disease.   5.  Hyperlipidemia.   6.  Seasonal allergies.      RECOMMENDATIONS:   1.  Atherosclerotic coronary artery disease, stable.  He completed a 2-year course of aspirin and Plavix without difficulty.  Let us continue with his aspirin therapy indefinitely.  Again, we have not started him on a beta blocker due to his relative bradycardia and fatigue.  He is a lifelong athlete.   2.  Stable EKG and myocardial perfusion study 2018.  Patient exercised for 16.5 METs with no evidence of ischemia.   3.  Hyperlipidemia.  Let us continue with his statin therapy. We will check labs today  4.  HTN: Continue Norvasc 2.5 mg daily  5.  Return to clinic in 1 year's time, echo later this year.       Sundar Osborne MD

## 2019-11-08 NOTE — LETTER
11/8/2019    [facility]  [address]  [facility] [address]    RE: Frank Hopper       Dear Colleague,    I had the pleasure of seeing Frank Hopper in the [facility].      Cardiology     I had the pleasure to follow up with your patient, Mr. Frank Hopper, in the Cardiovascular Medicine Clinic at [facility].  As you recall, this is a very pleasant 49-year-old gentleman who I first met in 2012.  He is a former patient of mine from [facility] who has followed to my current practice at [place].  He initially had an abnormal EKG with scooping ST segment depression in the inferior leads suggestive of ischemia.  The patient prior to the acquisition of the EKG had complained of chest discomfort while playing hockey.  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  He underwent a stress echocardiogram demonstrating a normal functional capacity, exercising for 13 METs with no evidence of ischemia; however, due to the concerning EKG, I ordered a CTA.  This demonstrated a  of the RCA with diffuse disease in a mild fashion of the LAD.  He underwent a cardiac cath in 02/2013 and was found to have a  of the RCA as demonstrated by CTA, and this was repaired by placing 3 drug-eluting stents from the mid RCA into the posterior descending artery in an overlapping fashion.  His EKG has since normalized.  He has not had any further symptoms or sequelae.  He had a myocardial perfusion study in 2015.  He exercised for 17 METs on a Alvin protocol.  No EKG changes.  The study was notable for normal LV systolic function with likely diaphragmatic attenuation.  No significant area of ischemia or infarct was noted.  He is not on a beta blocker due to relative bradycardia.  Here for a routine visit and has no active cardiac complaints.        Recent echocardiogram demonstrates normal LV systolic function. No significant valvular modalities noted. Here for his yearly  follow-up.      2018 MPI:  Impression    1.  Myocardial perfusion imaging using single isotope technique  demonstrated normal myocardial perfusion.   2. Gated images demonstrated normal wall motion.  The left ventricular  systolic function is normal with a calculated ejection fraction of  63%.     Procedure  The patient performed treadmill exercise using a Alvin protocol,  completing 13 minutes and 40 seconds with an estimated workload of  16.5 METS.  The test was terminated due to fatigue. The heart rate was  58 beats per minute at baseline and increased to 169 beats at peak  exercise, which was 99% of the maximum predicted heart rate. The rest  blood pressure was 122/80 mm/Hg and peak blood pressure is 172/76mm/Hg  with rate pressure product of 29,068. The patient experienced no chest  pain during the test. The patient was not on a beta blocker.     Myocardial perfusion imaging was performed at rest, approximately 45  minutes after the injection intravenously of 10.1of Tc-99m Myoview. At  peak exercise, the patient was injected intravenously with 32.6 mCi of   Tc-99m Myoview and exercise continued for approximately 1 minute.  Gated post-stress tomographic imaging was performed approximately 30  minutes after stress     EKG Findings  The resting EKG demonstrated sinus bradycardia incomplete right bundle  branch block. Small Q waves seen in the inferior leads probably not a  physiologic significance . The stress EKG demonstrated no significant  ST segment changes.  BMI 27     Tomographic Findings  Overall, the study quality is adequate though there is significant  diaphragmatic attenuation . On the stress images, normal myocardial  perfusion appears present. On the rest images, decrease in radiotracer  uptake in the antral septum and the inferior wall and the septum  likely due to attenuation artifacts . Gated images demonstrated normal  wall motion. The left ventricular ejection fraction was calculated to  be 63%.  TID was absent.          PHYSICAL EXAMINATION:   VITAL SIGNS:  Blood pressure 124/82, pulse 71, weight 94.3 kg (208 lb), SpO2 99 %.  GENERAL:  The patient is alert, oriented, in no apparent distress.   HEENT:  Oropharynx clear, no sinus tenderness.   NECK:  No JVP, no lymphadenopathy, no carotid bruits.   CARDIOVASCULAR:  Regular rate and rhythm, normal S1, S2, no murmurs, gallops or rubs.   LUNGS:  Coarse but clear.   ABDOMEN:  Soft, nontender, nondistended.   EXTREMITIES:  No clubbing, cyanosis or edema.      ASSESSMENT:   1.  Atherosclerotic coronary artery disease with successful complex PCI of RCA  02/2013 with 3 overlapping Promus drug-eluting stents.   2.  Mild nonocclusive disease elsewhere.   3.  Normal myocardial perfusion study 12/2015 from a surveillance standpoint.   4.  Family history of coronary artery disease.   5.  Hyperlipidemia.   6.  Seasonal allergies.      RECOMMENDATIONS:   1.  Atherosclerotic coronary artery disease, stable.  He completed a 2-year course of aspirin and Plavix without difficulty.  Let us continue with his aspirin therapy indefinitely.  Again, we have not started him on a beta blocker due to his relative bradycardia and fatigue.  He is a lifelong athlete.   2.  Stable EKG and myocardial perfusion study 2018.  Patient exercised for 16.5 METs with no evidence of ischemia.   3.  Hyperlipidemia.  Let us continue with his statin therapy. We will check labs today  4.  HTN: Continue Norvasc 2.5 mg daily  5.  Return to clinic in 1 year's time, echo later this year.       Sundar Osborne MD      Thank you for allowing me to participate in the care of your patient.      Sincerely,     Sundar Osborne MD     [facility]    cc:   No referring provider defined for this encounter.

## 2019-11-08 NOTE — LETTER
11/8/2019    [facility]  [address]  [facility] [address]    RE: Frank Hopper       Dear Colleague,    I had the pleasure of seeing Frank Hopper in the [facility].      Cardiology     I had the pleasure to follow up with your patient, Mr. Frank Hopper, in the Cardiovascular Medicine Clinic at [facility].  As you recall, this is a very pleasant 49-year-old gentleman who I first met in 2012.  He is a former patient of mine from [facility] who has followed to my current practice at [place].  He initially had an abnormal EKG with scooping ST segment depression in the inferior leads suggestive of ischemia.  The patient prior to the acquisition of the EKG had complained of chest discomfort while playing hockey.  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  He underwent a stress echocardiogram demonstrating a normal functional capacity, exercising for 13 METs with no evidence of ischemia; however, due to the concerning EKG, I ordered a CTA.  This demonstrated a  of the RCA with diffuse disease in a mild fashion of the LAD.  He underwent a cardiac cath in 02/2013 and was found to have a  of the RCA as demonstrated by CTA, and this was repaired by placing 3 drug-eluting stents from the mid RCA into the posterior descending artery in an overlapping fashion.  His EKG has since normalized.  He has not had any further symptoms or sequelae.  He had a myocardial perfusion study in 2015.  He exercised for 17 METs on a Alvin protocol.  No EKG changes.  The study was notable for normal LV systolic function with likely diaphragmatic attenuation.  No significant area of ischemia or infarct was noted.  He is not on a beta blocker due to relative bradycardia.  Here for a routine visit and has no active cardiac complaints.        Recent echocardiogram demonstrates normal LV systolic function. No significant valvular modalities noted. Here for his yearly  follow-up.      2018 MPI:  Impression    1.  Myocardial perfusion imaging using single isotope technique  demonstrated normal myocardial perfusion.   2. Gated images demonstrated normal wall motion.  The left ventricular  systolic function is normal with a calculated ejection fraction of  63%.     Procedure  The patient performed treadmill exercise using a Alvin protocol,  completing 13 minutes and 40 seconds with an estimated workload of  16.5 METS.  The test was terminated due to fatigue. The heart rate was  58 beats per minute at baseline and increased to 169 beats at peak  exercise, which was 99% of the maximum predicted heart rate. The rest  blood pressure was 122/80 mm/Hg and peak blood pressure is 172/76mm/Hg  with rate pressure product of 29,068. The patient experienced no chest  pain during the test. The patient was not on a beta blocker.     Myocardial perfusion imaging was performed at rest, approximately 45  minutes after the injection intravenously of 10.1of Tc-99m Myoview. At  peak exercise, the patient was injected intravenously with 32.6 mCi of   Tc-99m Myoview and exercise continued for approximately 1 minute.  Gated post-stress tomographic imaging was performed approximately 30  minutes after stress     EKG Findings  The resting EKG demonstrated sinus bradycardia incomplete right bundle  branch block. Small Q waves seen in the inferior leads probably not a  physiologic significance . The stress EKG demonstrated no significant  ST segment changes.  BMI 27     Tomographic Findings  Overall, the study quality is adequate though there is significant  diaphragmatic attenuation . On the stress images, normal myocardial  perfusion appears present. On the rest images, decrease in radiotracer  uptake in the antral septum and the inferior wall and the septum  likely due to attenuation artifacts . Gated images demonstrated normal  wall motion. The left ventricular ejection fraction was calculated to  be 63%.  TID was absent.          PHYSICAL EXAMINATION:   VITAL SIGNS:  Blood pressure 124/82, pulse 71, weight 94.3 kg (208 lb), SpO2 99 %.  GENERAL:  The patient is alert, oriented, in no apparent distress.   HEENT:  Oropharynx clear, no sinus tenderness.   NECK:  No JVP, no lymphadenopathy, no carotid bruits.   CARDIOVASCULAR:  Regular rate and rhythm, normal S1, S2, no murmurs, gallops or rubs.   LUNGS:  Coarse but clear.   ABDOMEN:  Soft, nontender, nondistended.   EXTREMITIES:  No clubbing, cyanosis or edema.      ASSESSMENT:   1.  Atherosclerotic coronary artery disease with successful complex PCI of RCA  02/2013 with 3 overlapping Promus drug-eluting stents.   2.  Mild nonocclusive disease elsewhere.   3.  Normal myocardial perfusion study 12/2015 from a surveillance standpoint.   4.  Family history of coronary artery disease.   5.  Hyperlipidemia.   6.  Seasonal allergies.      RECOMMENDATIONS:   1.  Atherosclerotic coronary artery disease, stable.  He completed a 2-year course of aspirin and Plavix without difficulty.  Let us continue with his aspirin therapy indefinitely.  Again, we have not started him on a beta blocker due to his relative bradycardia and fatigue.  He is a lifelong athlete.   2.  Stable EKG and myocardial perfusion study 2018.  Patient exercised for 16.5 METs with no evidence of ischemia.   3.  Hyperlipidemia.  Let us continue with his statin therapy. We will check labs today  4.  HTN: Continue Norvasc 2.5 mg daily  5.  Return to clinic in 1 year's time, echo later this year.         Thank you for allowing me to participate in the care of your patient.    Sincerely,     Sundar Osborne MD     [facility]

## 2019-11-26 ENCOUNTER — HOSPITAL ENCOUNTER (OUTPATIENT)
Dept: CARDIOLOGY | Facility: CLINIC | Age: 49
Discharge: HOME | End: 2019-11-26
Attending: INTERNAL MEDICINE | Admitting: INTERNAL MEDICINE
Payer: COMMERCIAL

## 2019-11-26 DIAGNOSIS — I25.10 CORONARY ARTERY DISEASE INVOLVING NATIVE CORONARY ARTERY OF NATIVE HEART WITHOUT ANGINA PECTORIS: ICD-10-CM

## 2019-11-26 PROCEDURE — 93306 TTE W/DOPPLER COMPLETE: CPT

## 2019-11-26 PROCEDURE — 93306 TTE W/DOPPLER COMPLETE: CPT | Mod: 26 | Performed by: INTERNAL MEDICINE

## 2020-02-06 DIAGNOSIS — E78.5 HYPERLIPIDEMIA: ICD-10-CM

## 2020-02-06 DIAGNOSIS — I25.10 CAD (CORONARY ARTERY DISEASE): ICD-10-CM

## 2020-02-06 DIAGNOSIS — Z82.49 FAMILY HISTORY OF CORONARY ARTERY DISEASE: ICD-10-CM

## 2020-02-17 DIAGNOSIS — E78.5 HYPERLIPIDEMIA: ICD-10-CM

## 2020-02-17 DIAGNOSIS — Z82.49 FAMILY HISTORY OF CORONARY ARTERY DISEASE: ICD-10-CM

## 2020-02-17 DIAGNOSIS — I25.10 CAD (CORONARY ARTERY DISEASE): ICD-10-CM

## 2020-02-17 RX ORDER — ATORVASTATIN CALCIUM 80 MG/1
80 TABLET, FILM COATED ORAL DAILY
Qty: 90 TABLET | Refills: 1 | Status: SHIPPED | OUTPATIENT
Start: 2020-02-17 | End: 2020-08-13

## 2020-07-23 DIAGNOSIS — I25.10 CORONARY ARTERY DISEASE INVOLVING NATIVE CORONARY ARTERY OF NATIVE HEART, ANGINA PRESENCE UNSPECIFIED: Primary | ICD-10-CM

## 2020-08-18 DIAGNOSIS — I25.10 CORONARY ARTERY DISEASE INVOLVING NATIVE CORONARY ARTERY OF NATIVE HEART, ANGINA PRESENCE UNSPECIFIED: Primary | ICD-10-CM

## 2020-09-22 ENCOUNTER — HOSPITAL ENCOUNTER (OUTPATIENT)
Dept: CARDIOLOGY | Facility: CLINIC | Age: 50
Discharge: HOME | End: 2020-09-22
Attending: INTERNAL MEDICINE | Admitting: INTERNAL MEDICINE
Payer: COMMERCIAL

## 2020-09-22 DIAGNOSIS — I25.10 CORONARY ARTERY DISEASE INVOLVING NATIVE CORONARY ARTERY OF NATIVE HEART, ANGINA PRESENCE UNSPECIFIED: ICD-10-CM

## 2020-09-22 PROCEDURE — 93306 TTE W/DOPPLER COMPLETE: CPT | Mod: 26 | Performed by: INTERNAL MEDICINE

## 2020-09-22 PROCEDURE — 93306 TTE W/DOPPLER COMPLETE: CPT

## 2020-09-28 ENCOUNTER — VIRTUAL VISIT (OUTPATIENT)
Dept: CARDIOLOGY | Facility: CLINIC | Age: 50
End: 2020-09-28
Payer: COMMERCIAL

## 2020-09-28 VITALS — BODY MASS INDEX: 26.25 KG/M2 | WEIGHT: 199 LBS

## 2020-09-28 DIAGNOSIS — I25.10 CORONARY ARTERY DISEASE INVOLVING NATIVE CORONARY ARTERY OF NATIVE HEART WITHOUT ANGINA PECTORIS: ICD-10-CM

## 2020-09-28 PROCEDURE — 99214 OFFICE O/P EST MOD 30 MIN: CPT | Mod: 95 | Performed by: INTERNAL MEDICINE

## 2020-09-28 RX ORDER — LOSARTAN POTASSIUM 50 MG/1
50 TABLET ORAL DAILY
COMMUNITY
End: 2021-01-01

## 2020-09-28 NOTE — LETTER
"9/28/2020    [facility]  [address]  [facility] [address]    RE: Frank Hopper       Dear Colleague,    I had the pleasure of seeing Frank Hopper in the [facility].    Frank Hopper is a 50 year old male who is being evaluated via a billable video visit.      The patient has been notified of following:     \"This video visit will be conducted via a call between you and your physician/provider. We have found that certain health care needs can be provided without the need for an in-person physical exam.  This service lets us provide the care you need with a video conversation.  If a prescription is necessary we can send it directly to your pharmacy.  If lab work is needed we can place an order for that and you can then stop by our lab to have the test done at a later time.    Video visits are billed at different rates depending on your insurance coverage.  Please reach out to your insurance provider with any questions.    If during the course of the call the physician/provider feels a video visit is not appropriate, you will not be charged for this service.\"    Patient has given verbal consent for Video visit? Yes  How would you like to obtain your AVS? MyChart  If you are dropped from the video visit, the video invite should be resent to: Text to cell phone: [phone]  Will anyone else be joining your video visit? No        Video-Visit Details    Type of service:  Video Visit    Video Start Time: 9:59 AM  Video End Time: 10:13 AM    Originating Location (pt. Location): Home    Distant Location (provider location):  [facility]     Platform used for Video Visit: Tj Osborne MD      Cardiology     I had the pleasure to follow up with your patient, Mr. Frank Hopper, in the Cardiovascular Medicine Clinic at [facility].  As you recall, this is a very pleasant 50-year-old gentleman who I first met in 2012.  " He is a former patient of mine from [facility] who has followed to my current practice at [place].  He initially had an abnormal EKG with scooping ST segment depression in the inferior leads suggestive of ischemia.  The patient prior to the acquisition of the EKG had complained of chest discomfort while playing hockey.  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  He underwent a stress echocardiogram demonstrating a normal functional capacity, exercising for 13 METs with no evidence of ischemia; however, due to the concerning EKG, I ordered a CTA.  This demonstrated a  of the RCA with diffuse disease in a mild fashion of the LAD.  He underwent a cardiac cath in 02/2013 and was found to have a  of the RCA as demonstrated by CTA, and this was repaired by placing 3 drug-eluting stents from the mid RCA into the posterior descending artery in an overlapping fashion.  His EKG has since normalized.  He has not had any further symptoms or sequelae.  He had a myocardial perfusion study in 2015.  He exercised for 17 METs on a Alvin protocol.  No EKG changes.  The study was notable for normal LV systolic function with likely diaphragmatic attenuation.  No significant area of ischemia or infarct was noted.  He is not on a beta blocker due to relative bradycardia.  Here for a routine visit and has no active cardiac complaints.        Recent echocardiogram demonstrates normal LV systolic function. No significant valvular modalities noted. Here for his yearly follow-up.      Echo 2020  1. Normal left ventricular size and systolic function. LVEF 60-65%.  2. No regional wall motion abnormalities.  3. Normal right ventricular size and systolic function.  4. No significant valve disease.     No significant changes compared to previous study dated 11/26/2019.    2018 MPI:  Impression    1.  Myocardial perfusion imaging using single isotope technique  demonstrated normal myocardial perfusion.   2. Gated images  demonstrated normal wall motion.  The left ventricular  systolic function is normal with a calculated ejection fraction of  63%.     Procedure  The patient performed treadmill exercise using a Alvin protocol,  completing 13 minutes and 40 seconds with an estimated workload of  16.5 METS.  The test was terminated due to fatigue. The heart rate was  58 beats per minute at baseline and increased to 169 beats at peak  exercise, which was 99% of the maximum predicted heart rate. The rest  blood pressure was 122/80 mm/Hg and peak blood pressure is 172/76mm/Hg  with rate pressure product of 29,068. The patient experienced no chest  pain during the test. The patient was not on a beta blocker.     Myocardial perfusion imaging was performed at rest, approximately 45  minutes after the injection intravenously of 10.1of Tc-99m Myoview. At  peak exercise, the patient was injected intravenously with 32.6 mCi of   Tc-99m Myoview and exercise continued for approximately 1 minute.  Gated post-stress tomographic imaging was performed approximately 30  minutes after stress     EKG Findings  The resting EKG demonstrated sinus bradycardia incomplete right bundle  branch block. Small Q waves seen in the inferior leads probably not a  physiologic significance . The stress EKG demonstrated no significant  ST segment changes.  BMI 27     Tomographic Findings  Overall, the study quality is adequate though there is significant  diaphragmatic attenuation . On the stress images, normal myocardial  perfusion appears present. On the rest images, decrease in radiotracer  uptake in the antral septum and the inferior wall and the septum  likely due to attenuation artifacts . Gated images demonstrated normal  wall motion. The left ventricular ejection fraction was calculated to  be 63%. TID was absent.      PHYSICAL EXAMINATION:   GENERAL: Healthy, alert and no distress  EYES: Eyes grossly normal to inspection.  No discharge or erythema, or obvious  scleral/conjunctival abnormalities.  RESP: No audible wheeze, cough, or visible cyanosis.  No visible retractions or increased work of breathing.    SKIN: Visible skin clear. No significant rash, abnormal pigmentation or lesions.  NEURO: Cranial nerves grossly intact.  Mentation and speech appropriate for age.  PSYCH: Mentation appears normal, affect normal/bright, judgement and insight intact, normal speech and appearance well-groomed.     ASSESSMENT:   1.  Atherosclerotic coronary artery disease with successful complex PCI of RCA  02/2013 with 3 overlapping Promus drug-eluting stents.   2.  Mild nonocclusive disease elsewhere.   3.  Normal myocardial perfusion study 12/2015 from a surveillance standpoint.   4.  Family history of coronary artery disease.   5.  Hyperlipidemia.   6.  Seasonal allergies.      RECOMMENDATIONS:   1.  Atherosclerotic coronary artery disease, stable.  He completed a 2-year course of aspirin and Plavix without difficulty.  Let us continue with his aspirin therapy indefinitely.  Again, we have not started him on a beta blocker due to his relative bradycardia and fatigue.  He is a lifelong athlete.   2.  Stable EKG and myocardial perfusion study 2018.  Patient exercised for 16.5 METs with no evidence of ischemia.   3.  Hyperlipidemia.  Let us continue with his statin therapy.   4.  HTN: Continue Norvasc 2.5 mg daily  5.  Return to clinic in 1 year's time, Stress echo and labs prior.            Thank you for allowing me to participate in the care of your patient.    Sincerely,     Sundar Osborne MD     [facility]

## 2020-12-21 DIAGNOSIS — I25.10 CORONARY ARTERY DISEASE INVOLVING NATIVE CORONARY ARTERY OF NATIVE HEART WITHOUT ANGINA PECTORIS: ICD-10-CM

## 2020-12-21 RX ORDER — NITROGLYCERIN 0.4 MG/1
TABLET SUBLINGUAL
Qty: 25 TABLET | Refills: 3 | Status: SHIPPED | OUTPATIENT
Start: 2020-12-21

## 2021-01-01 DIAGNOSIS — E78.5 HYPERLIPIDEMIA: ICD-10-CM

## 2021-01-01 DIAGNOSIS — I10 HTN (HYPERTENSION): ICD-10-CM

## 2021-01-01 DIAGNOSIS — Z82.49 FAMILY HISTORY OF CORONARY ARTERY DISEASE: ICD-10-CM

## 2021-01-01 DIAGNOSIS — J30.2 SEASONAL ALLERGIC RHINITIS: Primary | ICD-10-CM

## 2021-01-01 DIAGNOSIS — I25.10 CAD (CORONARY ARTERY DISEASE): ICD-10-CM

## 2021-01-01 RX ORDER — LORATADINE 10 MG/1
10 TABLET ORAL PRN
Qty: 90 TABLET | Refills: 3 | Status: SHIPPED | OUTPATIENT
Start: 2021-01-01

## 2021-01-01 RX ORDER — ATORVASTATIN CALCIUM 80 MG/1
80 TABLET, FILM COATED ORAL DAILY
Qty: 90 TABLET | Refills: 1 | Status: SHIPPED | OUTPATIENT
Start: 2021-01-01 | End: 2021-09-09

## 2021-01-01 RX ORDER — LOSARTAN POTASSIUM 50 MG/1
50 TABLET ORAL DAILY
Qty: 90 TABLET | Refills: 3 | Status: SHIPPED | OUTPATIENT
Start: 2021-01-01 | End: 2023-11-15

## 2021-01-01 RX ORDER — AMLODIPINE BESYLATE 2.5 MG/1
2.5 TABLET ORAL DAILY
Qty: 90 TABLET | Refills: 3 | Status: SHIPPED | OUTPATIENT
Start: 2021-01-01 | End: 2023-11-15

## 2021-01-01 NOTE — PROGRESS NOTES
Received a request to change over Frank's medications to a new pharmacy as his insurance will change at the new year. All medications reordered.Silvino Katz RN

## 2021-09-09 DIAGNOSIS — Z82.49 FAMILY HISTORY OF CORONARY ARTERY DISEASE: ICD-10-CM

## 2021-09-09 DIAGNOSIS — E78.5 HYPERLIPIDEMIA: ICD-10-CM

## 2021-09-09 DIAGNOSIS — I25.10 CAD (CORONARY ARTERY DISEASE): ICD-10-CM

## 2021-09-09 RX ORDER — ATORVASTATIN CALCIUM 80 MG/1
80 TABLET, FILM COATED ORAL DAILY
Qty: 90 TABLET | Refills: 0 | Status: SHIPPED | OUTPATIENT
Start: 2021-09-09 | End: 2021-12-16

## 2021-10-26 ENCOUNTER — TELEPHONE (OUTPATIENT)
Dept: CARDIOLOGY | Facility: CLINIC | Age: 51
End: 2021-10-26

## 2021-10-26 NOTE — TELEPHONE ENCOUNTER
VM received from patient regarding labs needed prior to appt with Dr. Osborne.    Returned call to patient, he got a hold of [facility], they scheduled all needed appts.    Shelby Escoto RN  [facility]  
Two to three times per week

## 2021-11-16 ENCOUNTER — LAB (OUTPATIENT)
Dept: LAB | Facility: CLINIC | Age: 51
End: 2021-11-16
Payer: COMMERCIAL

## 2021-11-16 ENCOUNTER — HOSPITAL ENCOUNTER (OUTPATIENT)
Dept: CARDIOLOGY | Facility: CLINIC | Age: 51
Discharge: HOME | End: 2021-11-16
Attending: INTERNAL MEDICINE | Admitting: INTERNAL MEDICINE
Payer: COMMERCIAL

## 2021-11-16 DIAGNOSIS — I25.10 CORONARY ARTERY DISEASE INVOLVING NATIVE CORONARY ARTERY OF NATIVE HEART WITHOUT ANGINA PECTORIS: ICD-10-CM

## 2021-11-16 LAB
ANION GAP SERPL CALCULATED.3IONS-SCNC: 4 MMOL/L (ref 3–14)
BUN SERPL-MCNC: 10 MG/DL (ref 7–30)
CALCIUM SERPL-MCNC: 9.2 MG/DL (ref 8.5–10.1)
CHLORIDE BLD-SCNC: 105 MMOL/L (ref 94–109)
CHOLEST SERPL-MCNC: 182 MG/DL
CO2 SERPL-SCNC: 29 MMOL/L (ref 20–32)
CREAT SERPL-MCNC: 0.98 MG/DL (ref 0.66–1.25)
FASTING STATUS PATIENT QL REPORTED: YES
GFR SERPL CREATININE-BSD FRML MDRD: 88 ML/MIN/1.73M2
GLUCOSE BLD-MCNC: 95 MG/DL (ref 70–99)
HDLC SERPL-MCNC: 69 MG/DL
LDLC SERPL CALC-MCNC: 91 MG/DL
NONHDLC SERPL-MCNC: 113 MG/DL
POTASSIUM BLD-SCNC: 4.2 MMOL/L (ref 3.4–5.3)
SODIUM SERPL-SCNC: 138 MMOL/L (ref 133–144)
TRIGL SERPL-MCNC: 110 MG/DL

## 2021-11-16 PROCEDURE — 93016 CV STRESS TEST SUPVJ ONLY: CPT

## 2021-11-16 PROCEDURE — 93325 DOPPLER ECHO COLOR FLOW MAPG: CPT | Mod: TC

## 2021-11-16 PROCEDURE — 93321 DOPPLER ECHO F-UP/LMTD STD: CPT | Mod: 26 | Performed by: INTERNAL MEDICINE

## 2021-11-16 PROCEDURE — 80061 LIPID PANEL: CPT | Performed by: INTERNAL MEDICINE

## 2021-11-16 PROCEDURE — 80048 BASIC METABOLIC PNL TOTAL CA: CPT | Performed by: INTERNAL MEDICINE

## 2021-11-16 PROCEDURE — 255N000002 HC RX 255 OP 636: Performed by: INTERNAL MEDICINE

## 2021-11-16 PROCEDURE — 93325 DOPPLER ECHO COLOR FLOW MAPG: CPT | Mod: 26 | Performed by: INTERNAL MEDICINE

## 2021-11-16 PROCEDURE — 36415 COLL VENOUS BLD VENIPUNCTURE: CPT | Performed by: INTERNAL MEDICINE

## 2021-11-16 PROCEDURE — 93350 STRESS TTE ONLY: CPT | Mod: 26 | Performed by: INTERNAL MEDICINE

## 2021-11-16 PROCEDURE — 93018 CV STRESS TEST I&R ONLY: CPT | Performed by: INTERNAL MEDICINE

## 2021-11-16 RX ADMIN — HUMAN ALBUMIN MICROSPHERES AND PERFLUTREN 2 ML: 10; .22 INJECTION, SOLUTION INTRAVENOUS at 10:14

## 2021-12-16 ENCOUNTER — OFFICE VISIT (OUTPATIENT)
Dept: CARDIOLOGY | Facility: CLINIC | Age: 51
End: 2021-12-16
Payer: COMMERCIAL

## 2021-12-16 VITALS
BODY MASS INDEX: 27.6 KG/M2 | OXYGEN SATURATION: 96 % | WEIGHT: 209.2 LBS | SYSTOLIC BLOOD PRESSURE: 130 MMHG | DIASTOLIC BLOOD PRESSURE: 88 MMHG | HEART RATE: 76 BPM

## 2021-12-16 DIAGNOSIS — I25.10 CORONARY ARTERY DISEASE INVOLVING NATIVE CORONARY ARTERY OF NATIVE HEART WITHOUT ANGINA PECTORIS: ICD-10-CM

## 2021-12-16 DIAGNOSIS — Z82.49 FAMILY HISTORY OF CORONARY ARTERY DISEASE: Primary | ICD-10-CM

## 2021-12-16 PROCEDURE — 99214 OFFICE O/P EST MOD 30 MIN: CPT | Performed by: INTERNAL MEDICINE

## 2021-12-16 RX ORDER — ROSUVASTATIN CALCIUM 20 MG/1
20 TABLET, COATED ORAL DAILY
Qty: 90 TABLET | Refills: 3 | Status: SHIPPED | OUTPATIENT
Start: 2021-12-16 | End: 2022-11-22

## 2021-12-16 RX ORDER — ATORVASTATIN CALCIUM 80 MG/1
80 TABLET, FILM COATED ORAL DAILY
Qty: 90 TABLET | Refills: 0 | Status: CANCELLED | OUTPATIENT
Start: 2021-12-16

## 2021-12-16 NOTE — LETTER
12/16/2021    [facility]  [address]  [facility] [address]    RE: Frank Hopper       Dear Colleague,     I had the pleasure of seeing Frank Hopper in the [facility].      Cardiology     I had the pleasure to follow up with your patient, Mr. Frank Hopper, in the Cardiovascular Medicine Clinic at [facility].  As you recall, this is a very pleasant 51-year-old gentleman who I first met in 2012.  He is a former patient of mine from [facility] who has followed to my current practice at [place].  He initially had an abnormal EKG with scooping ST segment depression in the inferior leads suggestive of ischemia.  The patient prior to the acquisition of the EKG had complained of chest discomfort while playing hockey.  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  He underwent a stress echocardiogram demonstrating a normal functional capacity, exercising for 13 METs with no evidence of ischemia; however, due to the concerning EKG, I ordered a CTA.  This demonstrated a  of the RCA with diffuse disease in a mild fashion of the LAD.  He underwent a cardiac cath in 02/2013 and was found to have a  of the RCA as demonstrated by CTA, and this was repaired by placing 3 drug-eluting stents from the mid RCA into the posterior descending artery in an overlapping fashion.  His EKG has since normalized.  He has not had any further symptoms or sequelae.       Recent echocardiogram demonstrates normal LV systolic function. No significant valvular modalities noted. Here for his yearly follow-up.      Stress Echo:  Normal baseline electrocardiogram.  The visual ejection fraction is estimated at 55-60%.  No regional wall motion abnormalities noted.  The patient exercised 12:19, 13.9 METS, above predicted for age and sex..  The patient exhibited no chest pain during exercise.  The EKG portion of this stress test was negative for inducible ischemia  With stress the  visual ejection fraction increased to 65-70% and there were no  regional wall motion abnormalities.  This was a negative stress echo.      Echo 2020  1. Normal left ventricular size and systolic function. LVEF 60-65%.  2. No regional wall motion abnormalities.  3. Normal right ventricular size and systolic function.  4. No significant valve disease.     No significant changes compared to previous study dated 11/26/2019.    2018 MPI:  Impression    1.  Myocardial perfusion imaging using single isotope technique  demonstrated normal myocardial perfusion.   2. Gated images demonstrated normal wall motion.  The left ventricular  systolic function is normal with a calculated ejection fraction of  63%.     Procedure  The patient performed treadmill exercise using a Alvin protocol,  completing 13 minutes and 40 seconds with an estimated workload of  16.5 METS.  The test was terminated due to fatigue. The heart rate was  58 beats per minute at baseline and increased to 169 beats at peak  exercise, which was 99% of the maximum predicted heart rate. The rest  blood pressure was 122/80 mm/Hg and peak blood pressure is 172/76mm/Hg  with rate pressure product of 29,068. The patient experienced no chest  pain during the test. The patient was not on a beta blocker.     Myocardial perfusion imaging was performed at rest, approximately 45  minutes after the injection intravenously of 10.1of Tc-99m Myoview. At  peak exercise, the patient was injected intravenously with 32.6 mCi of   Tc-99m Myoview and exercise continued for approximately 1 minute.  Gated post-stress tomographic imaging was performed approximately 30  minutes after stress     EKG Findings  The resting EKG demonstrated sinus bradycardia incomplete right bundle  branch block. Small Q waves seen in the inferior leads probably not a  physiologic significance . The stress EKG demonstrated no significant  ST segment changes.  BMI 27     Tomographic Findings  Overall, the  study quality is adequate though there is significant  diaphragmatic attenuation . On the stress images, normal myocardial  perfusion appears present. On the rest images, decrease in radiotracer  uptake in the antral septum and the inferior wall and the septum  likely due to attenuation artifacts . Gated images demonstrated normal  wall motion. The left ventricular ejection fraction was calculated to  be 63%. TID was absent.      PHYSICAL EXAMINATION:   GENERAL: Healthy, alert and no distress  EYES: Eyes grossly normal to inspection.  No discharge or erythema, or obvious scleral/conjunctival abnormalities.  RESP: No audible wheeze, cough, or visible cyanosis.  No visible retractions or increased work of breathing.    SKIN: Visible skin clear. No significant rash, abnormal pigmentation or lesions.  NEURO: Cranial nerves grossly intact.  Mentation and speech appropriate for age.  PSYCH: Mentation appears normal, affect normal/bright, judgement and insight intact, normal speech and appearance well-groomed.     ASSESSMENT:   1.  Atherosclerotic coronary artery disease with successful complex PCI of RCA  02/2013 with 3 overlapping Promus drug-eluting stents.   2.  Mild nonocclusive disease elsewhere.   3.  Normal myocardial perfusion study 12/2015 from a surveillance standpoint.   4.  Family history of coronary artery disease.   5.  Hyperlipidemia.   6.  Seasonal allergies.      RECOMMENDATIONS:   1.  Atherosclerotic coronary artery disease, stable.  He completed a 2-year course of aspirin and Plavix without difficulty.  Let us continue with his aspirin therapy indefinitely.  Again, we have not started him on a beta blocker due to his relative bradycardia and fatigue.  He is a lifelong athlete.   2.  Stable Stress echocardiogram  3.  Hyperlipidemia.  LDL has crept up.  We will switch him over from Lipitor to Crestor 20 mg daily.  His exercise regimen is also been altered with the Covid pandemic.  His resolution next  year is to be as active as he was prior to the pandemic.  Hopefully this will also impact his cholesterol favorably.  4.  HTN: Stable continue multidrug regimen  5.  Return to clinic in fall 2022 with preclinic lab work    Sundar Osborne MD      [facility]  cc:   No referring provider defined for this encounter.

## 2021-12-16 NOTE — PROGRESS NOTES
Cardiology     I had the pleasure to follow up with your patient, Mr. Frank Hopper, in the Cardiovascular Medicine Clinic at [facility].  As you recall, this is a very pleasant 51-year-old gentleman who I first met in 2012.  He is a former patient of mine from [facility] who has followed to my current practice at [place].  He initially had an abnormal EKG with scooping ST segment depression in the inferior leads suggestive of ischemia.  The patient prior to the acquisition of the EKG had complained of chest discomfort while playing hockey.  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  He underwent a stress echocardiogram demonstrating a normal functional capacity, exercising for 13 METs with no evidence of ischemia; however, due to the concerning EKG, I ordered a CTA.  This demonstrated a  of the RCA with diffuse disease in a mild fashion of the LAD.  He underwent a cardiac cath in 02/2013 and was found to have a  of the RCA as demonstrated by CTA, and this was repaired by placing 3 drug-eluting stents from the mid RCA into the posterior descending artery in an overlapping fashion.  His EKG has since normalized.  He has not had any further symptoms or sequelae.       Recent echocardiogram demonstrates normal LV systolic function. No significant valvular modalities noted. Here for his yearly follow-up.      Stress Echo:  Normal baseline electrocardiogram.  The visual ejection fraction is estimated at 55-60%.  No regional wall motion abnormalities noted.  The patient exercised 12:19, 13.9 METS, above predicted for age and sex..  The patient exhibited no chest pain during exercise.  The EKG portion of this stress test was negative for inducible ischemia  With stress the visual ejection fraction increased to 65-70% and there were no  regional wall motion abnormalities.  This was a negative stress echo.      Echo 2020  1. Normal left ventricular size and systolic function. LVEF 60-65%.  2.  No regional wall motion abnormalities.  3. Normal right ventricular size and systolic function.  4. No significant valve disease.     No significant changes compared to previous study dated 11/26/2019.    2018 MPI:  Impression    1.  Myocardial perfusion imaging using single isotope technique  demonstrated normal myocardial perfusion.   2. Gated images demonstrated normal wall motion.  The left ventricular  systolic function is normal with a calculated ejection fraction of  63%.     Procedure  The patient performed treadmill exercise using a Alvin protocol,  completing 13 minutes and 40 seconds with an estimated workload of  16.5 METS.  The test was terminated due to fatigue. The heart rate was  58 beats per minute at baseline and increased to 169 beats at peak  exercise, which was 99% of the maximum predicted heart rate. The rest  blood pressure was 122/80 mm/Hg and peak blood pressure is 172/76mm/Hg  with rate pressure product of 29,068. The patient experienced no chest  pain during the test. The patient was not on a beta blocker.     Myocardial perfusion imaging was performed at rest, approximately 45  minutes after the injection intravenously of 10.1of Tc-99m Myoview. At  peak exercise, the patient was injected intravenously with 32.6 mCi of   Tc-99m Myoview and exercise continued for approximately 1 minute.  Gated post-stress tomographic imaging was performed approximately 30  minutes after stress     EKG Findings  The resting EKG demonstrated sinus bradycardia incomplete right bundle  branch block. Small Q waves seen in the inferior leads probably not a  physiologic significance . The stress EKG demonstrated no significant  ST segment changes.  BMI 27     Tomographic Findings  Overall, the study quality is adequate though there is significant  diaphragmatic attenuation . On the stress images, normal myocardial  perfusion appears present. On the rest images, decrease in radiotracer  uptake in the antral septum  and the inferior wall and the septum  likely due to attenuation artifacts . Gated images demonstrated normal  wall motion. The left ventricular ejection fraction was calculated to  be 63%. TID was absent.      PHYSICAL EXAMINATION:   GENERAL: Healthy, alert and no distress  EYES: Eyes grossly normal to inspection.  No discharge or erythema, or obvious scleral/conjunctival abnormalities.  RESP: No audible wheeze, cough, or visible cyanosis.  No visible retractions or increased work of breathing.    SKIN: Visible skin clear. No significant rash, abnormal pigmentation or lesions.  NEURO: Cranial nerves grossly intact.  Mentation and speech appropriate for age.  PSYCH: Mentation appears normal, affect normal/bright, judgement and insight intact, normal speech and appearance well-groomed.     ASSESSMENT:   1.  Atherosclerotic coronary artery disease with successful complex PCI of RCA  02/2013 with 3 overlapping Promus drug-eluting stents.   2.  Mild nonocclusive disease elsewhere.   3.  Normal myocardial perfusion study 12/2015 from a surveillance standpoint.   4.  Family history of coronary artery disease.   5.  Hyperlipidemia.   6.  Seasonal allergies.      RECOMMENDATIONS:   1.  Atherosclerotic coronary artery disease, stable.  He completed a 2-year course of aspirin and Plavix without difficulty.  Let us continue with his aspirin therapy indefinitely.  Again, we have not started him on a beta blocker due to his relative bradycardia and fatigue.  He is a lifelong athlete.   2.  Stable Stress echocardiogram  3.  Hyperlipidemia.  LDL has crept up.  We will switch him over from Lipitor to Crestor 20 mg daily.  His exercise regimen is also been altered with the Covid pandemic.  His resolution next year is to be as active as he was prior to the pandemic.  Hopefully this will also impact his cholesterol favorably.  4.  HTN: Stable continue multidrug regimen  5.  Return to clinic in fall 2022 with preclinic lab  work    Sundar Osborne MD

## 2022-10-19 ENCOUNTER — TELEPHONE (OUTPATIENT)
Dept: CARDIOLOGY | Facility: CLINIC | Age: 52
End: 2022-10-19

## 2022-10-19 NOTE — TELEPHONE ENCOUNTER
[facility] Call Center    Phone Message    May a detailed message be left on voicemail: yes     Reason for Call: Other: Patient would like lab orders faxed to [facility] from Dr. Osborne. Contact patient when this is done.     Action Taken: Other: cardiology    Travel Screening: Not Applicable

## 2022-10-19 NOTE — TELEPHONE ENCOUNTER
Orders for FLP and ALT faxed to [facility] at fax # [phone]. Called pt and advised orders have been sent.

## 2022-11-22 DIAGNOSIS — E78.5 HLD (HYPERLIPIDEMIA): Primary | ICD-10-CM

## 2022-11-22 RX ORDER — ROSUVASTATIN CALCIUM 20 MG/1
20 TABLET, COATED ORAL DAILY
Qty: 90 TABLET | Refills: 0 | Status: SHIPPED | OUTPATIENT
Start: 2022-11-22 | End: 2022-12-05

## 2022-11-22 RX ORDER — ROSUVASTATIN CALCIUM 20 MG/1
TABLET, COATED ORAL
Qty: 90 TABLET | Refills: 3 | OUTPATIENT
Start: 2022-11-22

## 2022-12-05 ENCOUNTER — LAB (OUTPATIENT)
Dept: LAB | Facility: CLINIC | Age: 52
End: 2022-12-05
Payer: COMMERCIAL

## 2022-12-05 ENCOUNTER — OFFICE VISIT (OUTPATIENT)
Dept: CARDIOLOGY | Facility: CLINIC | Age: 52
End: 2022-12-05
Attending: INTERNAL MEDICINE
Payer: COMMERCIAL

## 2022-12-05 VITALS
BODY MASS INDEX: 27.43 KG/M2 | SYSTOLIC BLOOD PRESSURE: 135 MMHG | WEIGHT: 207 LBS | DIASTOLIC BLOOD PRESSURE: 87 MMHG | HEART RATE: 80 BPM | HEIGHT: 73 IN

## 2022-12-05 DIAGNOSIS — I25.10 CORONARY ARTERY DISEASE INVOLVING NATIVE CORONARY ARTERY OF NATIVE HEART WITHOUT ANGINA PECTORIS: ICD-10-CM

## 2022-12-05 DIAGNOSIS — I25.10 CORONARY ARTERY DISEASE INVOLVING NATIVE CORONARY ARTERY OF NATIVE HEART WITHOUT ANGINA PECTORIS: Primary | ICD-10-CM

## 2022-12-05 LAB — TSH SERPL DL<=0.005 MIU/L-ACNC: 1.26 MU/L (ref 0.4–4)

## 2022-12-05 PROCEDURE — 99214 OFFICE O/P EST MOD 30 MIN: CPT | Performed by: INTERNAL MEDICINE

## 2022-12-05 PROCEDURE — 84443 ASSAY THYROID STIM HORMONE: CPT | Performed by: INTERNAL MEDICINE

## 2022-12-05 PROCEDURE — 36415 COLL VENOUS BLD VENIPUNCTURE: CPT | Performed by: INTERNAL MEDICINE

## 2022-12-05 RX ORDER — ROSUVASTATIN CALCIUM 40 MG/1
20 TABLET, COATED ORAL DAILY
Qty: 90 TABLET | Refills: 3 | Status: SHIPPED | OUTPATIENT
Start: 2022-12-05 | End: 2022-12-06

## 2022-12-05 NOTE — PROGRESS NOTES
Cardiology     I had the pleasure to follow up with your patient, Mr. Frank Hopper, in the Cardiovascular Medicine Clinic at [facility].  As you recall, this is a very pleasant 52-year-old gentleman who I first met in 2012.  He is a former patient of mine from [facility] who has followed to my current practice at [place].  He initially had an abnormal EKG with scooping ST segment depression in the inferior leads suggestive of ischemia.  The patient prior to the acquisition of the EKG had complained of chest discomfort while playing hockey.  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  He underwent a stress echocardiogram demonstrating a normal functional capacity, exercising for 13 METs with no evidence of ischemia; however, due to the concerning EKG, I ordered a CTA.  This demonstrated a  of the RCA with diffuse disease in a mild fashion of the LAD.  He underwent a cardiac cath in 02/2013 and was found to have a  of the RCA as demonstrated by CTA, and this was repaired by placing 3 drug-eluting stents from the mid RCA into the posterior descending artery in an overlapping fashion.  His EKG has since normalized.  He has not had any further symptoms or sequelae.       Recent echocardiogram demonstrates normal LV systolic function. No significant valvular modalities noted. Here for his yearly follow-up.      Stress Echo: 2021  Normal baseline electrocardiogram.  The visual ejection fraction is estimated at 55-60%.  No regional wall motion abnormalities noted.  The patient exercised 12:19, 13.9 METS, above predicted for age and sex..  The patient exhibited no chest pain during exercise.  The EKG portion of this stress test was negative for inducible ischemia  With stress the visual ejection fraction increased to 65-70% and there were no  regional wall motion abnormalities.  This was a negative stress echo.      Echo 2020  1. Normal left ventricular size and systolic function. LVEF  60-65%.  2. No regional wall motion abnormalities.  3. Normal right ventricular size and systolic function.  4. No significant valve disease.     No significant changes compared to previous study dated 11/26/2019.      PHYSICAL EXAMINATION:   GENERAL: Healthy, alert and no distress  EYES: Eyes grossly normal to inspection.  No discharge or erythema, or obvious scleral/conjunctival abnormalities.  RESP: No audible wheeze, cough, or visible cyanosis.  No visible retractions or increased work of breathing.    SKIN: Visible skin clear. No significant rash, abnormal pigmentation or lesions.  NEURO: Cranial nerves grossly intact.  Mentation and speech appropriate for age.  PSYCH: Mentation appears normal, affect normal/bright, judgement and insight intact, normal speech and appearance well-groomed.     ASSESSMENT:   1.  Atherosclerotic coronary artery disease with successful complex PCI of RCA  02/2013 with 3 overlapping Promus drug-eluting stents.   2.  Mild nonocclusive disease elsewhere.   3.  Normal Stress Test 2021   4.  Family history of coronary artery disease.   5.  Hyperlipidemia.   6.  Seasonal allergies.      RECOMMENDATIONS:   1.  Atherosclerotic coronary artery disease, stable.  He completed a 2-year course of aspirin and Plavix without difficulty.  Let us continue with his aspirin therapy indefinitely.  Again, we have not started him on a beta blocker due to fatigue.    2.  Stable Stress echocardiogram 2021  3.  Hyperlipidemia:  LDL is 117.  We will increase Crestor to 40 mg daily.  Glucose earlier this was normal.  Will check TSH to exclude metabolic cause.    4.  HTN: Stable continue multidrug regimen  5.  Return to clinic in fall 2023 with preclinic echocardiogram    Sundar Osborne MD

## 2022-12-05 NOTE — LETTER
12/5/2022    [facility]  [address]  [facility] [address]    RE: Frank Hopper       Dear Colleague,     I had the pleasure of seeing Frank Hopper in the [facility] [place] [facility].      Cardiology     I had the pleasure to follow up with your patient, Mr. Frank Hopper, in the Cardiovascular Medicine Clinic at [facility].  As you recall, this is a very pleasant 52-year-old gentleman who I first met in 2012.  He is a former patient of mine from [facility] who has followed to my current practice at [place].  He initially had an abnormal EKG with scooping ST segment depression in the inferior leads suggestive of ischemia.  The patient prior to the acquisition of the EKG had complained of chest discomfort while playing hockey.  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  He underwent a stress echocardiogram demonstrating a normal functional capacity, exercising for 13 METs with no evidence of ischemia; however, due to the concerning EKG, I ordered a CTA.  This demonstrated a  of the RCA with diffuse disease in a mild fashion of the LAD.  He underwent a cardiac cath in 02/2013 and was found to have a  of the RCA as demonstrated by CTA, and this was repaired by placing 3 drug-eluting stents from the mid RCA into the posterior descending artery in an overlapping fashion.  His EKG has since normalized.  He has not had any further symptoms or sequelae.       Recent echocardiogram demonstrates normal LV systolic function. No significant valvular modalities noted. Here for his yearly follow-up.      Stress Echo: 2021  Normal baseline electrocardiogram.  The visual ejection fraction is estimated at 55-60%.  No regional wall motion abnormalities noted.  The patient exercised 12:19, 13.9 METS, above predicted for age and sex..  The patient exhibited no chest pain during exercise.  The EKG portion of this stress test was negative for inducible ischemia  With stress the  visual ejection fraction increased to 65-70% and there were no  regional wall motion abnormalities.  This was a negative stress echo.      Echo 2020  1. Normal left ventricular size and systolic function. LVEF 60-65%.  2. No regional wall motion abnormalities.  3. Normal right ventricular size and systolic function.  4. No significant valve disease.     No significant changes compared to previous study dated 11/26/2019.      PHYSICAL EXAMINATION:   GENERAL: Healthy, alert and no distress  EYES: Eyes grossly normal to inspection.  No discharge or erythema, or obvious scleral/conjunctival abnormalities.  RESP: No audible wheeze, cough, or visible cyanosis.  No visible retractions or increased work of breathing.    SKIN: Visible skin clear. No significant rash, abnormal pigmentation or lesions.  NEURO: Cranial nerves grossly intact.  Mentation and speech appropriate for age.  PSYCH: Mentation appears normal, affect normal/bright, judgement and insight intact, normal speech and appearance well-groomed.     ASSESSMENT:   1.  Atherosclerotic coronary artery disease with successful complex PCI of RCA  02/2013 with 3 overlapping Promus drug-eluting stents.   2.  Mild nonocclusive disease elsewhere.   3.  Normal Stress Test 2021   4.  Family history of coronary artery disease.   5.  Hyperlipidemia.   6.  Seasonal allergies.      RECOMMENDATIONS:   1.  Atherosclerotic coronary artery disease, stable.  He completed a 2-year course of aspirin and Plavix without difficulty.  Let us continue with his aspirin therapy indefinitely.  Again, we have not started him on a beta blocker due to fatigue.    2.  Stable Stress echocardiogram 2021  3.  Hyperlipidemia:  LDL is 117.  We will increase Crestor to 40 mg daily.  Glucose earlier this was normal.  Will check TSH to exclude metabolic cause.    4.  HTN: Stable continue multidrug regimen  5.  Return to clinic in fall 2023 with preclinic echocardiogram    Sundar Osborne  MD    Thank you for allowing me to participate in the care of your patient.      Sincerely,     Sundar Osborne MD     [facility]  cc:   Sundar Osborne MD  [address] [facility]  [place] [address]

## 2022-12-06 RX ORDER — ROSUVASTATIN CALCIUM 40 MG/1
40 TABLET, COATED ORAL DAILY
Qty: 90 TABLET | Refills: 3 | Status: SHIPPED | OUTPATIENT
Start: 2022-12-06 | End: 2023-11-15

## 2023-04-25 ENCOUNTER — TELEPHONE (OUTPATIENT)
Dept: CARDIOLOGY | Facility: CLINIC | Age: 53
End: 2023-04-25
Payer: COMMERCIAL

## 2023-04-25 NOTE — TELEPHONE ENCOUNTER
[facility] Call Center    Phone Message    May a detailed message be left on voicemail: no     Reason for Call: Medication Question or concern regarding medication   Prescription Clarification  Name of Medication: rosuvastatin (CRESTOR) 40 MG tablet  Prescribing Provider: [place]    Pharmacy: [id number] - [place], [place] - [address]     What on the order needs clarification? Pharmacy was calling patient is having a hard time cutting pills in half. Patient thinks he should only be taking half of a pill and not a full 40 mg. If patient is supposed to take a full 40 mg please call patient back to discuss. If patient is supposed to take 20 mg please send a new prescription to the pharmacy. Please call pharmacy if any questions.       Action Taken: Other: Cardiology     Travel Screening: Not Applicable     Thank you!  [facility]

## 2023-04-25 NOTE — TELEPHONE ENCOUNTER
Called pt, reviewed pt's rosuvastatin was increased to 40 mg daily at last visit with Dr. Osborne. Pt stated he has been taking a full tablet but then with his last refill realized it said a half tablet and he was only give 45 pills. Discussed pt likely has been filling the old prescription, advised will make sure pharmacy has correct prescription on file to fill.     Called pharmacy and clarified pt should be taking rosuvastatin 40 mg daily and most recent prescription sent on 12/6/23 is correct. Pharmacy stated they will fill this for pt.

## 2023-10-04 ENCOUNTER — HOSPITAL ENCOUNTER (OUTPATIENT)
Dept: CARDIOLOGY | Facility: CLINIC | Age: 53
Discharge: HOME | End: 2023-10-04
Attending: INTERNAL MEDICINE | Admitting: INTERNAL MEDICINE
Payer: COMMERCIAL

## 2023-10-04 DIAGNOSIS — I25.10 CORONARY ARTERY DISEASE INVOLVING NATIVE CORONARY ARTERY OF NATIVE HEART WITHOUT ANGINA PECTORIS: ICD-10-CM

## 2023-10-04 LAB — LVEF ECHO: NORMAL

## 2023-10-04 PROCEDURE — 93306 TTE W/DOPPLER COMPLETE: CPT | Mod: 26 | Performed by: INTERNAL MEDICINE

## 2023-10-04 PROCEDURE — 93306 TTE W/DOPPLER COMPLETE: CPT

## 2023-11-15 ENCOUNTER — OFFICE VISIT (OUTPATIENT)
Dept: CARDIOLOGY | Facility: CLINIC | Age: 53
End: 2023-11-15
Attending: INTERNAL MEDICINE
Payer: COMMERCIAL

## 2023-11-15 VITALS
OXYGEN SATURATION: 98 % | HEIGHT: 73 IN | DIASTOLIC BLOOD PRESSURE: 87 MMHG | HEART RATE: 68 BPM | RESPIRATION RATE: 20 BRPM | WEIGHT: 205.6 LBS | BODY MASS INDEX: 27.25 KG/M2 | SYSTOLIC BLOOD PRESSURE: 129 MMHG

## 2023-11-15 DIAGNOSIS — I10 HYPERTENSION, UNSPECIFIED TYPE: ICD-10-CM

## 2023-11-15 DIAGNOSIS — I25.10 CORONARY ARTERY DISEASE INVOLVING NATIVE CORONARY ARTERY OF NATIVE HEART WITHOUT ANGINA PECTORIS: ICD-10-CM

## 2023-11-15 PROCEDURE — 99214 OFFICE O/P EST MOD 30 MIN: CPT | Performed by: INTERNAL MEDICINE

## 2023-11-15 RX ORDER — ROSUVASTATIN CALCIUM 40 MG/1
40 TABLET, COATED ORAL DAILY
Qty: 90 TABLET | Refills: 3 | Status: SHIPPED | OUTPATIENT
Start: 2023-11-15

## 2023-11-15 RX ORDER — LOSARTAN POTASSIUM 50 MG/1
50 TABLET ORAL DAILY
Qty: 90 TABLET | Refills: 3 | Status: SHIPPED | OUTPATIENT
Start: 2023-11-15

## 2023-11-15 NOTE — PROGRESS NOTES
Cardiology     I had the pleasure to follow up with your patient, Mr. Frank Hopper, in the Cardiovascular Medicine Clinic at [facility].  As you recall, this is a very pleasant 53-year-old gentleman who I first met in 2012.  He is a former patient of mine from [facility] who has followed to my current practice at [place].  He initially had an abnormal EKG with scooping ST segment depression in the inferior leads suggestive of ischemia.  The patient prior to the acquisition of the EKG had complained of chest discomfort while playing hockey.  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  He underwent a stress echocardiogram demonstrating a normal functional capacity, exercising for 13 METs with no evidence of ischemia; however, due to the concerning EKG, I ordered a CTA.  This demonstrated a  of the RCA with diffuse disease in a mild fashion of the LAD.  He underwent a cardiac cath in 02/2013 and was found to have a  of the RCA as demonstrated by CTA, and this was repaired by placing 3 drug-eluting stents from the mid RCA into the posterior descending artery in an overlapping fashion.  His EKG has since normalized.  He has not had any further symptoms or sequelae.       Recent echocardiogram demonstrates normal LV systolic function. No significant valvular modalities noted. Here for his yearly follow-up.      Stress Echo: 2021  Normal baseline electrocardiogram.  The visual ejection fraction is estimated at 55-60%.  No regional wall motion abnormalities noted.  The patient exercised 12:19, 13.9 METS, above predicted for age and sex..  The patient exhibited no chest pain during exercise.  The EKG portion of this stress test was negative for inducible ischemia  With stress the visual ejection fraction increased to 65-70% and there were no  regional wall motion abnormalities.  This was a negative stress echo.      Echo 2023  Left ventricular systolic function is normal.  The visual ejection  "fraction is 55-60%.  No regional wall motion abnormalities noted.  The study was technically adequate. Compared to the prior study dated 9/20,  there have been no changes.      PHYSICAL EXAMINATION:   /87   Pulse 68   Resp 20   Ht 1.854 m (6' 1\")   Wt 93.3 kg (205 lb 9.6 oz)   SpO2 98%   BMI 27.13 kg/m        GENERAL: Healthy, alert and no distress  EYES: Eyes grossly normal to inspection.  No discharge or erythema, or obvious scleral/conjunctival abnormalities.  RESP: No audible wheeze, cough, or visible cyanosis.  No visible retractions or increased work of breathing.    SKIN: Visible skin clear. No significant rash, abnormal pigmentation or lesions.  NEURO: Cranial nerves grossly intact.  Mentation and speech appropriate for age.  PSYCH: Mentation appears normal, affect normal/bright, judgement and insight intact, normal speech and appearance well-groomed.     ASSESSMENT:   1.  Atherosclerotic coronary artery disease with successful complex PCI of RCA  02/2013 with 3 overlapping Promus drug-eluting stents.   2.  Mild nonocclusive disease elsewhere.   3.  Normal Stress Test 2021   4.  Family history of coronary artery disease.   5.  Hyperlipidemia.   6.  Seasonal allergies.      RECOMMENDATIONS:   1.  Atherosclerotic coronary artery disease, stable.  He completed a 2-year course of aspirin and Plavix without difficulty.  Let us continue with his aspirin therapy indefinitely.  Again, we have not started him on a beta blocker due to fatigue.    2.  Surveillance stress study next year.    3.  Hyperlipidemia: Updated cholesterol has been ordered continue with his Crestor at this time..    4.  HTN: Stable continue multidrug regimen  5.  Return to clinic in fall 2024 with preclinic stress echocardiogram, and lab work    Sundar Osborne MD    "

## 2023-11-15 NOTE — LETTER
11/15/2023    [facility]  [address]  [facility] [address]    RE: Frank Hopper       Dear Colleague,     I had the pleasure of seeing Frank Hopper in the [facility] [place] [facility].      Cardiology     I had the pleasure to follow up with your patient, Mr. Frank Hopper, in the Cardiovascular Medicine Clinic at [facility].  As you recall, this is a very pleasant 53-year-old gentleman who I first met in 2012.  He is a former patient of mine from [facility] who has followed to my current practice at [place].  He initially had an abnormal EKG with scooping ST segment depression in the inferior leads suggestive of ischemia.  The patient prior to the acquisition of the EKG had complained of chest discomfort while playing hockey.  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  He underwent a stress echocardiogram demonstrating a normal functional capacity, exercising for 13 METs with no evidence of ischemia; however, due to the concerning EKG, I ordered a CTA.  This demonstrated a  of the RCA with diffuse disease in a mild fashion of the LAD.  He underwent a cardiac cath in 02/2013 and was found to have a  of the RCA as demonstrated by CTA, and this was repaired by placing 3 drug-eluting stents from the mid RCA into the posterior descending artery in an overlapping fashion.  His EKG has since normalized.  He has not had any further symptoms or sequelae.       Recent echocardiogram demonstrates normal LV systolic function. No significant valvular modalities noted. Here for his yearly follow-up.      Stress Echo: 2021  Normal baseline electrocardiogram.  The visual ejection fraction is estimated at 55-60%.  No regional wall motion abnormalities noted.  The patient exercised 12:19, 13.9 METS, above predicted for age and sex..  The patient exhibited no chest pain during exercise.  The EKG portion of this stress test was negative for inducible ischemia  With stress  "the visual ejection fraction increased to 65-70% and there were no  regional wall motion abnormalities.  This was a negative stress echo.      Echo 2023  Left ventricular systolic function is normal.  The visual ejection fraction is 55-60%.  No regional wall motion abnormalities noted.  The study was technically adequate. Compared to the prior study dated 9/20,  there have been no changes.      PHYSICAL EXAMINATION:   /87   Pulse 68   Resp 20   Ht 1.854 m (6' 1\")   Wt 93.3 kg (205 lb 9.6 oz)   SpO2 98%   BMI 27.13 kg/m        GENERAL: Healthy, alert and no distress  EYES: Eyes grossly normal to inspection.  No discharge or erythema, or obvious scleral/conjunctival abnormalities.  RESP: No audible wheeze, cough, or visible cyanosis.  No visible retractions or increased work of breathing.    SKIN: Visible skin clear. No significant rash, abnormal pigmentation or lesions.  NEURO: Cranial nerves grossly intact.  Mentation and speech appropriate for age.  PSYCH: Mentation appears normal, affect normal/bright, judgement and insight intact, normal speech and appearance well-groomed.     ASSESSMENT:   1.  Atherosclerotic coronary artery disease with successful complex PCI of RCA  02/2013 with 3 overlapping Promus drug-eluting stents.   2.  Mild nonocclusive disease elsewhere.   3.  Normal Stress Test 2021   4.  Family history of coronary artery disease.   5.  Hyperlipidemia.   6.  Seasonal allergies.      RECOMMENDATIONS:   1.  Atherosclerotic coronary artery disease, stable.  He completed a 2-year course of aspirin and Plavix without difficulty.  Let us continue with his aspirin therapy indefinitely.  Again, we have not started him on a beta blocker due to fatigue.    2.  Surveillance stress study next year.    3.  Hyperlipidemia: Updated cholesterol has been ordered continue with his Crestor at this time..    4.  HTN: Stable continue multidrug regimen  5.  Return to clinic in fall 2024 with preclinic stress " echocardiogram, and lab work    Sundar Osborne MD      Thank you for allowing me to participate in the care of your patient.      Sincerely,     Sundar Osborne MD     [facility]  cc:   Sundar Osborne MD  [address] [place] [id number]

## 2023-11-26 ENCOUNTER — HEALTH MAINTENANCE LETTER (OUTPATIENT)
Age: 53
End: 2023-11-26

## 2024-01-08 RX ORDER — ATORVASTATIN CALCIUM 80 MG/1
TABLET, FILM COATED ORAL
Qty: 90 TABLET | Refills: 1 | OUTPATIENT
Start: 2024-01-08

## 2024-01-10 DIAGNOSIS — I25.10 CORONARY ARTERY DISEASE INVOLVING NATIVE CORONARY ARTERY OF NATIVE HEART WITHOUT ANGINA PECTORIS: ICD-10-CM

## 2024-01-10 RX ORDER — ROSUVASTATIN CALCIUM 40 MG/1
40 TABLET, COATED ORAL DAILY
Qty: 90 TABLET | Refills: 3 | OUTPATIENT
Start: 2024-01-10

## 2024-10-08 DIAGNOSIS — I25.10 CORONARY ARTERY DISEASE INVOLVING NATIVE CORONARY ARTERY OF NATIVE HEART WITHOUT ANGINA PECTORIS: ICD-10-CM

## 2024-10-08 RX ORDER — ROSUVASTATIN CALCIUM 40 MG/1
40 TABLET, COATED ORAL DAILY
Qty: 90 TABLET | Refills: 0 | Status: SHIPPED | OUTPATIENT
Start: 2024-10-08

## 2024-10-08 RX ORDER — ROSUVASTATIN CALCIUM 40 MG/1
40 TABLET, COATED ORAL DAILY
Qty: 90 TABLET | Refills: 3 | OUTPATIENT
Start: 2024-10-08

## 2024-11-19 ENCOUNTER — HOSPITAL ENCOUNTER (OUTPATIENT)
Dept: CARDIOLOGY | Facility: CLINIC | Age: 54
Discharge: HOME | End: 2024-11-19
Attending: INTERNAL MEDICINE
Payer: COMMERCIAL

## 2024-11-19 ENCOUNTER — LAB (OUTPATIENT)
Dept: LAB | Facility: CLINIC | Age: 54
End: 2024-11-19
Payer: COMMERCIAL

## 2024-11-19 DIAGNOSIS — I25.10 CORONARY ARTERY DISEASE INVOLVING NATIVE CORONARY ARTERY OF NATIVE HEART WITHOUT ANGINA PECTORIS: ICD-10-CM

## 2024-11-19 LAB
ALT SERPL W P-5'-P-CCNC: 33 U/L (ref 0–70)
ANION GAP SERPL CALCULATED.3IONS-SCNC: 16 MMOL/L (ref 7–15)
BUN SERPL-MCNC: 9.8 MG/DL (ref 6–20)
CALCIUM SERPL-MCNC: 9.6 MG/DL (ref 8.8–10.4)
CHLORIDE SERPL-SCNC: 100 MMOL/L (ref 98–107)
CHOLEST SERPL-MCNC: 162 MG/DL
CREAT SERPL-MCNC: 1.04 MG/DL (ref 0.67–1.17)
EGFRCR SERPLBLD CKD-EPI 2021: 84 ML/MIN/1.73M2
FASTING STATUS PATIENT QL REPORTED: YES
FASTING STATUS PATIENT QL REPORTED: YES
GLUCOSE SERPL-MCNC: 104 MG/DL (ref 70–99)
HCO3 SERPL-SCNC: 23 MMOL/L (ref 22–29)
HDLC SERPL-MCNC: 53 MG/DL
LDLC SERPL CALC-MCNC: 86 MG/DL
NONHDLC SERPL-MCNC: 109 MG/DL
POTASSIUM SERPL-SCNC: 4.7 MMOL/L (ref 3.4–5.3)
SODIUM SERPL-SCNC: 139 MMOL/L (ref 135–145)
TRIGL SERPL-MCNC: 117 MG/DL

## 2024-11-19 PROCEDURE — 255N000002 HC RX 255 OP 636: Performed by: INTERNAL MEDICINE

## 2024-11-19 PROCEDURE — 93016 CV STRESS TEST SUPVJ ONLY: CPT | Performed by: INTERNAL MEDICINE

## 2024-11-19 PROCEDURE — 93325 DOPPLER ECHO COLOR FLOW MAPG: CPT | Mod: TC

## 2024-11-19 RX ADMIN — HUMAN ALBUMIN MICROSPHERES AND PERFLUTREN 2 ML: 10; .22 INJECTION, SOLUTION INTRAVENOUS at 09:28

## 2024-11-20 ENCOUNTER — OFFICE VISIT (OUTPATIENT)
Dept: CARDIOLOGY | Facility: CLINIC | Age: 54
End: 2024-11-20
Attending: INTERNAL MEDICINE
Payer: COMMERCIAL

## 2024-11-20 VITALS
DIASTOLIC BLOOD PRESSURE: 94 MMHG | WEIGHT: 207 LBS | SYSTOLIC BLOOD PRESSURE: 145 MMHG | HEART RATE: 87 BPM | OXYGEN SATURATION: 99 % | BODY MASS INDEX: 27.31 KG/M2

## 2024-11-20 DIAGNOSIS — I25.10 CORONARY ARTERY DISEASE INVOLVING NATIVE CORONARY ARTERY OF NATIVE HEART WITHOUT ANGINA PECTORIS: Primary | ICD-10-CM

## 2024-11-20 NOTE — PROGRESS NOTES
Cardiology     I had the pleasure to follow up with your patient, Mr. Frank Hopper, in the Cardiovascular Medicine Clinic at [facility].  As you recall, this is a very pleasant 53-year-old gentleman who I first met in 2012.  He is a former patient of mine from [facility] who has followed to my current practice at [place].  He initially had an abnormal EKG with scooping ST segment depression in the inferior leads suggestive of ischemia.  The patient prior to the acquisition of the EKG had complained of chest discomfort while playing hockey.  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  He underwent a stress echocardiogram demonstrating a normal functional capacity, exercising for 13 METs with no evidence of ischemia; however, due to the concerning EKG, I ordered a CTA.  This demonstrated a  of the RCA with diffuse disease in a mild fashion of the LAD.  He underwent a cardiac cath in 02/2013 and was found to have a  of the RCA as demonstrated by CTA, and this was repaired by placing 3 drug-eluting stents from the mid RCA into the posterior descending artery in an overlapping fashion.  His EKG has since normalized.  He has not had any further symptoms or sequelae.       Recent echocardiogram demonstrates normal LV systolic function. No significant valvular modalities noted. Here for his yearly follow-up.    Interval time since I last saw him patient unfortunately has been diagnosed with prostate cancer.  He is scheduled to undergo surgery in January 2025.  In preparation for this and also for his usual surveillance standpoint he underwent a stress echocardiogram as noted below.      Stress Echo: 2024  Exercise stress echocardiogram is negative for ischemia or infarction at an  excercise capacity of 13.4 METS and 95% of the target heart rate.  There was no chest pain or significant ST changes with exercise.  The Duke treadmill score was low risk ( >5 Luis score).  No change compared to  prior study from 2021.      Echo 2023  Left ventricular systolic function is normal.  The visual ejection fraction is 55-60%.  No regional wall motion abnormalities noted.  The study was technically adequate. Compared to the prior study dated 9/20,  there have been no changes.      PHYSICAL EXAMINATION:   There were no vitals taken for this visit.      GENERAL: Healthy, alert and no distress  EYES: Eyes grossly normal to inspection.  No discharge or erythema, or obvious scleral/conjunctival abnormalities.  RESP: No audible wheeze, cough, or visible cyanosis.  No visible retractions or increased work of breathing.    SKIN: Visible skin clear. No significant rash, abnormal pigmentation or lesions.  NEURO: Cranial nerves grossly intact.  Mentation and speech appropriate for age.  PSYCH: Mentation appears normal, affect normal/bright, judgement and insight intact, normal speech and appearance well-groomed.     ASSESSMENT:   1.  Atherosclerotic coronary artery disease with successful complex PCI of RCA  02/2013 with 3 overlapping Promus drug-eluting stents.   2.  Mild nonocclusive disease elsewhere.   3.  Normal Stress Test 2021   4.  Family history of coronary artery disease.   5.  Hyperlipidemia.   6.  Seasonal allergies.   7.  Recent diagnosis of prostate cancer with patient needing surgery     RECOMMENDATIONS:   1.  Atherosclerotic coronary artery disease, stable.  He completed a 2-year course of aspirin and Plavix without difficulty.  Let us continue with his aspirin therapy indefinitely.  Again, we have not started him on a beta blocker due to fatigue.    2.  Surveillance stress echocardiogram this year is normal.  3.  Hyperlipidemia: Updated cholesterol has been ordered continue with his Crestor at this time..    4.  HTN: Stable continue multidrug regimen  5.  Preoperative clearance: Patient has stable recent cardiac testing with no active symptoms.  He is okay to undergo prostate surgery.  We have wished him  all the best on his upcoming surgery.  6.  Return to clinic in fall 2025 with preclinic lab work    Sundar Osborne MD

## 2024-11-20 NOTE — LETTER
11/20/2024    [facility]  [address]  [facility] [address]    RE: Frank Hopper       Dear Colleague,     I had the pleasure of seeing Frank Hopper in the [facility].      Cardiology     I had the pleasure to follow up with your patient, Mr. Frank Hopper, in the Cardiovascular Medicine Clinic at [facility].  As you recall, this is a very pleasant 53-year-old gentleman who I first met in 2012.  He is a former patient of mine from [facility] who has followed to my current practice at [facility].  He initially had an abnormal EKG with scooping ST segment depression in the inferior leads suggestive of ischemia.  The patient prior to the acquisition of the EKG had complained of chest discomfort while playing hockey.  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  He underwent a stress echocardiogram demonstrating a normal functional capacity, exercising for 13 METs with no evidence of ischemia; however, due to the concerning EKG, I ordered a CTA.  This demonstrated a  of the RCA with diffuse disease in a mild fashion of the LAD.  He underwent a cardiac cath in 02/2013 and was found to have a  of the RCA as demonstrated by CTA, and this was repaired by placing 3 drug-eluting stents from the mid RCA into the posterior descending artery in an overlapping fashion.  His EKG has since normalized.  He has not had any further symptoms or sequelae.       Recent echocardiogram demonstrates normal LV systolic function. No significant valvular modalities noted. Here for his yearly follow-up.    Interval time since I last saw him patient unfortunately has been diagnosed with prostate cancer.  He is scheduled to undergo surgery in January 2025.  In preparation for this and also for his usual surveillance standpoint he underwent a stress echocardiogram as noted below.      Stress Echo: 2024  Exercise stress echocardiogram is negative for ischemia or infarction at  an  excercise capacity of 13.4 METS and 95% of the target heart rate.  There was no chest pain or significant ST changes with exercise.  The Duke treadmill score was low risk ( >5 Luis score).  No change compared to prior study from 2021.      Echo 2023  Left ventricular systolic function is normal.  The visual ejection fraction is 55-60%.  No regional wall motion abnormalities noted.  The study was technically adequate. Compared to the prior study dated 9/20,  there have been no changes.      PHYSICAL EXAMINATION:   There were no vitals taken for this visit.      GENERAL: Healthy, alert and no distress  EYES: Eyes grossly normal to inspection.  No discharge or erythema, or obvious scleral/conjunctival abnormalities.  RESP: No audible wheeze, cough, or visible cyanosis.  No visible retractions or increased work of breathing.    SKIN: Visible skin clear. No significant rash, abnormal pigmentation or lesions.  NEURO: Cranial nerves grossly intact.  Mentation and speech appropriate for age.  PSYCH: Mentation appears normal, affect normal/bright, judgement and insight intact, normal speech and appearance well-groomed.     ASSESSMENT:   1.  Atherosclerotic coronary artery disease with successful complex PCI of RCA  02/2013 with 3 overlapping Promus drug-eluting stents.   2.  Mild nonocclusive disease elsewhere.   3.  Normal Stress Test 2021   4.  Family history of coronary artery disease.   5.  Hyperlipidemia.   6.  Seasonal allergies.   7.  Recent diagnosis of prostate cancer with patient needing surgery     RECOMMENDATIONS:   1.  Atherosclerotic coronary artery disease, stable.  He completed a 2-year course of aspirin and Plavix without difficulty.  Let us continue with his aspirin therapy indefinitely.  Again, we have not started him on a beta blocker due to fatigue.    2.  Surveillance stress echocardiogram this year is normal.  3.  Hyperlipidemia: Updated cholesterol has been ordered continue with his Crestor at  this time..    4.  HTN: Stable continue multidrug regimen  5.  Preoperative clearance: Patient has stable recent cardiac testing with no active symptoms.  He is okay to undergo prostate surgery.  We have wished him all the best on his upcoming surgery.  6.  Return to clinic in fall 2025 with preclinic lab work    Sundar Osborne MD      Thank you for allowing me to participate in the care of your patient.      Sincerely,     Sundar Osborne MD     [facility]  cc:   Sundar Osborne MD  [address] [address]

## 2025-01-04 ENCOUNTER — HEALTH MAINTENANCE LETTER (OUTPATIENT)
Age: 55
End: 2025-01-04

## 2025-06-03 ENCOUNTER — TELEPHONE (OUTPATIENT)
Dept: CARDIOLOGY | Facility: CLINIC | Age: 55
End: 2025-06-03
Payer: COMMERCIAL

## 2025-06-03 NOTE — TELEPHONE ENCOUNTER
[facility] Call Center    Phone Message    May a detailed message be left on voicemail: yes     Reason for Call: Other: Pt requesting orders for blood work be sent to [facility] in [place] [id number] phone# [phone] did not have fax# available. Please call pt and leave detailed msg when orders have been sent.     Action Taken: Other: Cardiology     Travel Screening: Not Applicable     Thank you!  [facility]

## 2025-06-03 NOTE — TELEPHONE ENCOUNTER
Lab orders faxed to [facility] at fax # [phone]. Called and left message notifying pt orders faxed.     Yearly labs not due till Nov. 2025. Okay to draw early in Oct prior to early visit.     Mary Spence RN